# Patient Record
Sex: FEMALE | Race: WHITE | Employment: FULL TIME | URBAN - METROPOLITAN AREA
[De-identification: names, ages, dates, MRNs, and addresses within clinical notes are randomized per-mention and may not be internally consistent; named-entity substitution may affect disease eponyms.]

---

## 2017-01-03 ENCOUNTER — OFFICE VISIT (OUTPATIENT)
Dept: PHYSICAL THERAPY | Age: 67
End: 2017-01-03
Attending: ORTHOPAEDIC SURGERY
Payer: COMMERCIAL

## 2017-01-03 PROCEDURE — 97116 GAIT TRAINING THERAPY: CPT

## 2017-01-03 PROCEDURE — 97110 THERAPEUTIC EXERCISES: CPT

## 2017-01-03 NOTE — PROGRESS NOTES
Dx: s/p L TKA 12/1/16              Authorized # of Visits:  13 Saji Honeycutt         Next MD visit: Name of Surgeon: Park Gray;  Follow up appt:~ Jan 26th  Fall Risk: standard        Precautions: no additional             Subjective: Pt reports significant stiffn extension        SLR 10 reps  X x 2 sets        Heel slide 10 reps STM to incision+ patellar mobilization gr III 5 min         Seated knee flexion overpressure stretch 10 sec hold x 20 reps Heel slide 10 reps        Seated TKE 10 reps Seated knee flexion o

## 2017-01-05 ENCOUNTER — OFFICE VISIT (OUTPATIENT)
Dept: PHYSICAL THERAPY | Age: 67
End: 2017-01-05
Attending: ORTHOPAEDIC SURGERY
Payer: COMMERCIAL

## 2017-01-05 PROCEDURE — 97110 THERAPEUTIC EXERCISES: CPT

## 2017-01-05 PROCEDURE — 97530 THERAPEUTIC ACTIVITIES: CPT

## 2017-01-05 NOTE — PROGRESS NOTES
Dx: s/p L TKA 12/1/16              Authorized # of Visits:  13 Vibha Shipley MD visit: Name of Surgeon: Manny Miranda;  Follow up appt:~ Jan 26th  Fall Risk: standard        Precautions: no additional             Subjective: Pt reports significant stiffn sets- 1 set with Manual Overpressure extension X 10 reps        SLR 10 reps  X x 2 sets X 20 reps       Heel slide 10 reps STM to incision+ patellar mobilization gr III 5 min  STM to superior medial quad 3 min         HS stretch + calf stretch 30 sec hold

## 2017-01-11 ENCOUNTER — OFFICE VISIT (OUTPATIENT)
Dept: PHYSICAL THERAPY | Age: 67
End: 2017-01-11
Attending: ORTHOPAEDIC SURGERY
Payer: COMMERCIAL

## 2017-01-11 PROCEDURE — 97530 THERAPEUTIC ACTIVITIES: CPT

## 2017-01-11 PROCEDURE — 97110 THERAPEUTIC EXERCISES: CPT

## 2017-01-11 NOTE — PROGRESS NOTES
Dx: s/p L TKA 12/1/16              Authorized # of Visits:  13 Moisés Sinha         Next MD visit: Name of Surgeon: Mark Sherman;  Follow up appt:~ Jan 26th  Fall Risk: standard        Precautions: no additional             Subjective: Pt reports feeling stiff this overpressure extension 10 sec hold 10 reps        HS stretch + calf stretch 30 sec hold x 3 sets Prone knee hang extension 2 lbs 5 min      Seated knee flexion overpressure stretch 10 sec hold x 20 reps Heel slide 10 reps X 15 reps X 15 reps      Seated TK

## 2017-01-12 ENCOUNTER — APPOINTMENT (OUTPATIENT)
Dept: PHYSICAL THERAPY | Age: 67
End: 2017-01-12
Attending: ORTHOPAEDIC SURGERY
Payer: COMMERCIAL

## 2017-01-13 ENCOUNTER — OFFICE VISIT (OUTPATIENT)
Dept: PHYSICAL THERAPY | Age: 67
End: 2017-01-13
Attending: ORTHOPAEDIC SURGERY
Payer: COMMERCIAL

## 2017-01-13 PROCEDURE — 97110 THERAPEUTIC EXERCISES: CPT

## 2017-01-13 NOTE — PROGRESS NOTES
Dx: s/p L TKA 12/1/16              Authorized # of Visits:  13 [place] [place]         Next MD visit: Name of Surgeon: Gissel Hale;  Follow up appt:~ Jan 26th  Fall Risk: standard        Precautions: no additional             Subjective: Pt reports doing some exercis Quad set 10 sec hold x 10 reps with manual overpressure extension  X 10 reps 10 sec hold x 3 sets- 1 set with Manual Overpressure extension X 10 reps   X 10 reps 10 sec hold x 3 sets Supine 10 sec hold x 10 reps     SLR 10 reps  X x 2 sets X 20 reps HS- knee extension 2 #  Charges:  There ex: 3     Total Timed Treatment: 45 min  Total Treatment Time: 50 min

## 2017-01-17 ENCOUNTER — OFFICE VISIT (OUTPATIENT)
Dept: PHYSICAL THERAPY | Age: 67
End: 2017-01-17
Attending: ORTHOPAEDIC SURGERY
Payer: COMMERCIAL

## 2017-01-17 PROCEDURE — 97140 MANUAL THERAPY 1/> REGIONS: CPT

## 2017-01-17 PROCEDURE — 97110 THERAPEUTIC EXERCISES: CPT

## 2017-01-17 NOTE — PROGRESS NOTES
Dx: s/p L TKA 12/1/16              Authorized # of Visits:  13 Adriane Hernandez         Next MD visit: Name of Surgeon: Kelly Valladares;  Follow up appt:~ Jan 26th  Fall Risk: standard        Precautions: no additional             Subjective: Pt reports muscle soreness in level 8  X seat 8  X 5 min; seat level 8 X 5 min; seat level 7    Quad set 10 sec hold x 10 reps with manual overpressure extension  X 10 reps 10 sec hold x 3 sets- 1 set with Manual Overpressure extension X 10 reps   X 10 reps 10 sec hold x 3 sets Supine leg press level 5 25 reps R/L STM to posterior medial HS on the R, incision 5 min    Prone knee hang 2-3 min no weight  Prone knee hang 3 min, no pain  Pt education: HEP recommendations and benefits  Pt education: HEP Update with weighted extension Shuttle

## 2017-01-19 ENCOUNTER — OFFICE VISIT (OUTPATIENT)
Dept: PHYSICAL THERAPY | Age: 67
End: 2017-01-19
Attending: ORTHOPAEDIC SURGERY
Payer: COMMERCIAL

## 2017-01-19 PROCEDURE — 97140 MANUAL THERAPY 1/> REGIONS: CPT

## 2017-01-19 PROCEDURE — 97110 THERAPEUTIC EXERCISES: CPT

## 2017-01-19 NOTE — PROGRESS NOTES
Dx: s/p L TKA 12/1/16              Authorized # of Visits:  13 Marcelline Dakin         Next MD visit: Name of Surgeon: Madhuri Ricketts;  Follow up appt:~ Jan 26th  Fall Risk: standard        Precautions: no additional             Subjective: Pt report she has been workin level 7 4 min NuStep, 3 load   Quad set 10 sec hold x 10 reps with manual overpressure extension  X 10 reps 10 sec hold x 3 sets- 1 set with Manual Overpressure extension X 10 reps   X 10 reps 10 sec hold x 3 sets Supine 10 sec hold x 10 reps Quad set 10 s unremarkable X 20 reps X 20 reps SLR with 10 sec hold x 10 reps on the L -   Stairs 4 inch up 10 reps; up and over- forward/retro 10 reps  Gait 5 laps 25' with SPC X 5 laps X 5 laps no SPC Shuttle single leg press level 5 25 reps R/L STM to posterior media

## 2017-01-24 ENCOUNTER — OFFICE VISIT (OUTPATIENT)
Dept: PHYSICAL THERAPY | Age: 67
End: 2017-01-24
Attending: ORTHOPAEDIC SURGERY
Payer: COMMERCIAL

## 2017-01-24 PROCEDURE — 97110 THERAPEUTIC EXERCISES: CPT

## 2017-01-24 PROCEDURE — 97140 MANUAL THERAPY 1/> REGIONS: CPT

## 2017-01-24 PROCEDURE — 97035 APP MDLTY 1+ULTRASOUND EA 15: CPT

## 2017-01-24 NOTE — PROGRESS NOTES
Dx: s/p L TKA 12/1/16              Authorized # of Visits:  13 Rehana Shipley MD visit: Name of Surgeon: Mark Lawson;  Follow up appt:~ Jan 26th  Fall Risk: standard        Precautions: no additional             Subjective: Pt reports she has a lot of m level 8 full revolution X level 8  X seat 8  X 5 min; seat level 8 X 5 min; seat level 7 4 min NuStep, 3 load X 4 min; seat level 8   Quad set 10 sec hold x 10 reps with manual overpressure extension  X 10 reps 10 sec hold x 3 sets- 1 set with Manual Overp green   Gait training 8 min: VCs for heel strike and knee flexion w/ and with SPC Step ups 6 inch 10 reps x 2 sets Stairs: reciprocal 1 FOS x 2 reps 1 UE assist - Closed chain HS curls on stool; forward 30'; back 30' x 2 reps Prone quad stretch 30 sec hold

## 2017-01-26 ENCOUNTER — OFFICE VISIT (OUTPATIENT)
Dept: PHYSICAL THERAPY | Age: 67
End: 2017-01-26
Attending: ORTHOPAEDIC SURGERY
Payer: COMMERCIAL

## 2017-01-26 PROCEDURE — 97110 THERAPEUTIC EXERCISES: CPT

## 2017-01-26 PROCEDURE — 97140 MANUAL THERAPY 1/> REGIONS: CPT

## 2017-01-26 NOTE — PROGRESS NOTES
Dx: s/p L TKA 12/1/16              Authorized # of Visits:  13 Adriane Hernandez         Next MD visit: Name of Surgeon: Kelly Valladares;  Follow up appt:~ Jan 26th  Fall Risk: standard        Precautions: no additional           Progress Summary:   Pt attended 10 visits i will be independent and compliant with comprehensive HEP to maintain progress achieved in PT- MET    Plan: Continue per plan of care to address strength, ROM, balance, proprioception, functional activity tolerance for additional 3-5 therapy sessions 1-2x/w extension OP 10 reps x 2 sets  Standing TKE, ball at wall, 2 sec, x20 STM to post knee after US, 5 min Seated knee flexion manual stretch 10 sec hold x 10 reps   X 15 reps X 15 reps with contract relax 10 sec hold 10 sec relax Shuttle: DL: x20, 5c  SL: x20

## 2017-01-31 ENCOUNTER — OFFICE VISIT (OUTPATIENT)
Dept: PHYSICAL THERAPY | Age: 67
End: 2017-01-31
Attending: ORTHOPAEDIC SURGERY
Payer: COMMERCIAL

## 2017-01-31 PROCEDURE — 97110 THERAPEUTIC EXERCISES: CPT

## 2017-01-31 PROCEDURE — 97140 MANUAL THERAPY 1/> REGIONS: CPT

## 2017-01-31 NOTE — PROGRESS NOTES
Dx: s/p L TKA 12/1/16              Authorized # of Visits:  2 used 2016; 8 auth 2017+ 5 pending 2017        Next MD visit: Name of Surgeon: Lendell Cowden;  Follow up appt:~ Jan 26th  Fall Risk: standard        Precautions: no additional           Subjective: Pt Quad set: x20, 3 sec, distal LE on bolste Manual OP extension 20 sec hold x 10 reps x 2 sets with quad sets    Manual extension overpressure 5 min intermittently X 10 sec hold max tolerance Manual STM - post knee ham/calf  10 min Manual STM - pes anserine the R, incision 5 min - - - Floor to stand transfer x 1 attempt with VCs and demonstrative cues   Shuttle double leg heel raises with calf stretch 25 reps- 10 sec hold calf stretch - - - - -   HEP (written handouts provided) and pt education: calf stretch;

## 2017-02-01 ENCOUNTER — TELEPHONE (OUTPATIENT)
Dept: FAMILY MEDICINE CLINIC | Facility: CLINIC | Age: 67
End: 2017-02-01

## 2017-02-01 DIAGNOSIS — E78.5 DYSLIPIDEMIA: Primary | ICD-10-CM

## 2017-02-01 DIAGNOSIS — I10 ESSENTIAL HYPERTENSION, BENIGN: ICD-10-CM

## 2017-02-01 NOTE — TELEPHONE ENCOUNTER
Please advise. Last seen 7/2016 for these issues but since then for her knee issue- in Sept, Oct and Nov. 2016. Last labs 2016 October.   She is looking for a mail order refill?    thanks

## 2017-02-01 NOTE — TELEPHONE ENCOUNTER
simvastatin 20 MG Oral Tab  Losartan Potassium 100 MG Oral Tab  Pt is running low on her meds. She had knee surgery - She would like to [facility] to [facility]. She will make appt - as soon as she is doing better.

## 2017-02-02 ENCOUNTER — OFFICE VISIT (OUTPATIENT)
Dept: PHYSICAL THERAPY | Age: 67
End: 2017-02-02
Attending: ORTHOPAEDIC SURGERY
Payer: COMMERCIAL

## 2017-02-02 PROCEDURE — 97110 THERAPEUTIC EXERCISES: CPT

## 2017-02-02 PROCEDURE — 97140 MANUAL THERAPY 1/> REGIONS: CPT

## 2017-02-02 RX ORDER — SIMVASTATIN 20 MG
20 TABLET ORAL
Qty: 90 TABLET | Refills: 0 | Status: SHIPPED | OUTPATIENT
Start: 2017-02-02 | End: 2017-05-08

## 2017-02-02 RX ORDER — LOSARTAN POTASSIUM 100 MG/1
100 TABLET ORAL DAILY
Qty: 90 TABLET | Refills: 0 | Status: SHIPPED | OUTPATIENT
Start: 2017-02-02 | End: 2017-05-08

## 2017-02-02 NOTE — PROGRESS NOTES
Dx: s/p L TKA 12/1/16              Authorized # of Visits:  2 used 2016; 8 auth 2017+ 5 pending 2017        Next MD visit: Name of Surgeon: Lindsay Gutierres;  Follow up appt:~ Jan 26th  Fall Risk: standard        Precautions: no additional           Subjective: Pt set: x20, 3 sec, distal LE on bolster Quad set: x20, 3 sec, distal LE on bolste Manual OP extension 20 sec hold x 10 reps x 2 sets with quad sets  X 20 sec hold x 10 reps   Manual extension overpressure 5 min intermittently X 10 sec hold max tolerance [facility] finger touch support, x20 Sit to stand 20 reps Stairs reciprocal rail on R/L unilaterally x 2 sets Gait in gym 2 laps 900' in <10 min    X 20 reps SLR with 10 sec hold x 10 reps on the L - Box lunge for flex stretch, 8 inch, 10 sec, x5 - Gait in gym, incre

## 2017-02-07 ENCOUNTER — OFFICE VISIT (OUTPATIENT)
Dept: PHYSICAL THERAPY | Age: 67
End: 2017-02-07
Attending: ORTHOPAEDIC SURGERY
Payer: COMMERCIAL

## 2017-02-07 PROCEDURE — 97140 MANUAL THERAPY 1/> REGIONS: CPT

## 2017-02-07 PROCEDURE — 97110 THERAPEUTIC EXERCISES: CPT

## 2017-02-07 NOTE — PROGRESS NOTES
Dx: s/p L TKA 12/1/16              Authorized # of Visits:  2 used 2016; 8 auth 2017+ 5 pending 2017        Next MD visit: Name of Surgeon: Juana Hart;  Follow up appt:~ Jan 26th  Fall Risk: standard        Precautions: no additional           Subjective: Pt 5 min seat level 7  Bike level 5 hills 5 min seat level 7  X 5 min   Supine 10 sec hold x 10 reps Quad set 10 sec hold x 10 reps Quad set: x20, 3 sec, distal LE on bolster Quad set: x20, 3 sec, distal LE on bolster Quad set: x20, 3 sec, distal LE on bolste 10 sec relax Shuttle: DL: x20, 5c  SL: x20, 4c standing TKE, x20 green Seated CC hamstring curls on stool 4 laps Shuttle single leg press level 5 20 reps R/L x 2 sets Shuttle single leg press level 6 20 reps R/L x 2 sets Shuttle double leg heel raises leve

## 2017-02-09 ENCOUNTER — OFFICE VISIT (OUTPATIENT)
Dept: PHYSICAL THERAPY | Age: 67
End: 2017-02-09
Attending: ORTHOPAEDIC SURGERY
Payer: COMMERCIAL

## 2017-02-09 PROCEDURE — 97110 THERAPEUTIC EXERCISES: CPT

## 2017-02-09 PROCEDURE — 97112 NEUROMUSCULAR REEDUCATION: CPT

## 2017-02-09 NOTE — PROGRESS NOTES
Dx: s/p L TKA 12/1/16              Authorized # of Visits:  2 used 2016; 8 auth 2017+ 5 pending 2017        Next MD visit: Name of Surgeon: Manny Miranda;  Follow up appt:~ Jan 26th  Fall Risk: standard        Precautions: no additional           Subjective: Pt HEP      1/24/17  Tx#: 9/13 1/26/17  Tx#: 10/13 1/31/17  Tx#: 11/15 2/2/217  Tx#: 12/15   2/7/17  Tx#: 13/15 2/9/17  Tx#: 14/15   X 4 min; seat level 8 Bike X 5 min seat level 8, tigthness Bike 5 min seat level 7  Bike level 5 hills 5 min seat level 7  X 5 stand 20 reps Stairs reciprocal rail on R/L unilaterally x 2 sets Gait in gym 2 laps 900' in <10 min  Standing TKE with red band resistance 10 sec hold x 10 reps x 2 sets Standing posterior stepping with ER 10 reps R/L   Box lunge for flex stretch, 8 inch,

## 2017-02-14 ENCOUNTER — OFFICE VISIT (OUTPATIENT)
Dept: PHYSICAL THERAPY | Age: 67
End: 2017-02-14
Attending: ORTHOPAEDIC SURGERY
Payer: COMMERCIAL

## 2017-02-14 PROCEDURE — 97110 THERAPEUTIC EXERCISES: CPT

## 2017-02-14 NOTE — PROGRESS NOTES
Dx: s/p L TKA 12/1/16              Authorized # of Visits:  2 used 2016; 8 auth 2017+ 5 pending 2017        Next MD visit: Name of Surgeon: Angelique Mace;  Follow up appt:~ Jan 26th  Fall Risk: standard        Precautions: no additional           Discharge Jhonathan course of care. Thank you for your referral. If you have any questions, please contact me at Dept: [phone].     Sincerely,  Electronically signed by therapist: Romayne Cliche, PT        1/24/17  Tx#: 9/13 1/26/17  Tx#: 10/13 1/31/17  Tx#: 11/15 2/2/ piriformis stretch 30 sec hold x 3 sets -   standing TKE, x20 green Seated CC hamstring curls on stool 4 laps Shuttle single leg press level 5 20 reps R/L x 2 sets Shuttle single leg press level 6 20 reps R/L x 2 sets Shuttle double leg heel raises level 6

## 2017-02-23 ENCOUNTER — APPOINTMENT (OUTPATIENT)
Dept: PHYSICAL THERAPY | Age: 67
End: 2017-02-23
Attending: ORTHOPAEDIC SURGERY
Payer: COMMERCIAL

## 2017-05-08 ENCOUNTER — OFFICE VISIT (OUTPATIENT)
Dept: FAMILY MEDICINE CLINIC | Facility: CLINIC | Age: 67
End: 2017-05-08

## 2017-05-08 VITALS
HEART RATE: 83 BPM | HEIGHT: 66 IN | WEIGHT: 210.19 LBS | RESPIRATION RATE: 16 BRPM | BODY MASS INDEX: 33.78 KG/M2 | DIASTOLIC BLOOD PRESSURE: 74 MMHG | SYSTOLIC BLOOD PRESSURE: 136 MMHG

## 2017-05-08 DIAGNOSIS — I10 ESSENTIAL HYPERTENSION, BENIGN: ICD-10-CM

## 2017-05-08 DIAGNOSIS — E78.5 DYSLIPIDEMIA: ICD-10-CM

## 2017-05-08 DIAGNOSIS — Z23 NEED FOR VACCINATION: ICD-10-CM

## 2017-05-08 DIAGNOSIS — Z00.00 ROUTINE GENERAL MEDICAL EXAMINATION AT A HEALTH CARE FACILITY: Primary | ICD-10-CM

## 2017-05-08 DIAGNOSIS — E03.9 HYPOTHYROIDISM (ACQUIRED): Primary | ICD-10-CM

## 2017-05-08 DIAGNOSIS — Z00.00 LABORATORY EXAM ORDERED AS PART OF ROUTINE GENERAL MEDICAL EXAMINATION: ICD-10-CM

## 2017-05-08 DIAGNOSIS — R73.03 PREDIABETES: ICD-10-CM

## 2017-05-08 DIAGNOSIS — Z12.31 ENCOUNTER FOR SCREENING MAMMOGRAM FOR MALIGNANT NEOPLASM OF BREAST: ICD-10-CM

## 2017-05-08 DIAGNOSIS — E66.09 NON MORBID OBESITY DUE TO EXCESS CALORIES: ICD-10-CM

## 2017-05-08 PROCEDURE — 90471 IMMUNIZATION ADMIN: CPT | Performed by: FAMILY MEDICINE

## 2017-05-08 PROCEDURE — 90670 PCV13 VACCINE IM: CPT | Performed by: FAMILY MEDICINE

## 2017-05-08 PROCEDURE — 99397 PER PM REEVAL EST PAT 65+ YR: CPT | Performed by: FAMILY MEDICINE

## 2017-05-08 RX ORDER — OMEPRAZOLE 20 MG/1
20 CAPSULE, DELAYED RELEASE ORAL DAILY
Refills: 0 | COMMUNITY
Start: 2017-03-19 | End: 2017-10-02

## 2017-05-08 RX ORDER — SIMVASTATIN 20 MG
20 TABLET ORAL
Qty: 30 TABLET | Refills: 0 | Status: SHIPPED | OUTPATIENT
Start: 2017-05-08 | End: 2017-05-31

## 2017-05-08 RX ORDER — LEVOTHYROXINE SODIUM 0.07 MG/1
75 TABLET ORAL DAILY
Qty: 30 TABLET | Refills: 0 | Status: SHIPPED | OUTPATIENT
Start: 2017-05-08 | End: 2017-05-31

## 2017-05-08 RX ORDER — LOSARTAN POTASSIUM 100 MG/1
100 TABLET ORAL DAILY
Qty: 30 TABLET | Refills: 0 | Status: SHIPPED | OUTPATIENT
Start: 2017-05-08 | End: 2017-05-31

## 2017-05-08 NOTE — PROGRESS NOTES
HPI:   Dontae Beauchamp is a 74year old female who presents for a complete physical exam.   Symptoms: denies discharge, itching, burning or dysuria.    Sexually active: no   Last colonoscopy: 2016  Last mammogram: 1-2 years ago      Wt Readings from Last reflux    • Diverticulosis of large intestine    • Hypercholesterolemia 11/16/2016   • Hypothyroidism (acquired) 7/13/2012   • Status post total left knee replacement 12/7/2016          Past Surgical History    APPENDECTOMY  1972    UPPER GI ENDOSCOPY,EXAM rashes,no suspicious lesions  HEENT: atraumatic, normocephalic,ears, nose and throat are normal, mmm  EYES: PERRLA, EOMI, sclera, conjunctiva are clear  NECK: supple, no adenopathy/thyromegaly/masses  CHEST: no chest tenderness  BREAST: symmetrical, no juan Prediabetes  Recheck A1c  - Hemoglobin A1C [E]; Future    5. Laboratory exam ordered as part of routine general medical examination  - CBC W/DIFF; Future  - COMP METABOLIC PANEL; Future  - LIPID PANEL; Future  - TSH; Future  - T4 FREE;  Future  - VITAMIN D,

## 2017-05-09 ENCOUNTER — HOSPITAL ENCOUNTER (OUTPATIENT)
Dept: MAMMOGRAPHY | Age: 67
Discharge: HOME | End: 2017-05-09
Attending: FAMILY MEDICINE
Payer: COMMERCIAL

## 2017-05-09 DIAGNOSIS — Z12.31 ENCOUNTER FOR SCREENING MAMMOGRAM FOR MALIGNANT NEOPLASM OF BREAST: ICD-10-CM

## 2017-05-09 PROCEDURE — 77067 SCR MAMMO BI INCL CAD: CPT | Performed by: FAMILY MEDICINE

## 2017-05-13 ENCOUNTER — LAB ENCOUNTER (OUTPATIENT)
Dept: LAB | Age: 67
End: 2017-05-13
Attending: FAMILY MEDICINE
Payer: COMMERCIAL

## 2017-05-13 DIAGNOSIS — Z00.00 LABORATORY EXAM ORDERED AS PART OF ROUTINE GENERAL MEDICAL EXAMINATION: ICD-10-CM

## 2017-05-13 DIAGNOSIS — R73.03 PREDIABETES: ICD-10-CM

## 2017-05-13 PROCEDURE — 85025 COMPLETE CBC W/AUTO DIFF WBC: CPT

## 2017-05-13 PROCEDURE — 80053 COMPREHEN METABOLIC PANEL: CPT

## 2017-05-13 PROCEDURE — 82306 VITAMIN D 25 HYDROXY: CPT

## 2017-05-13 PROCEDURE — 83036 HEMOGLOBIN GLYCOSYLATED A1C: CPT

## 2017-05-13 PROCEDURE — 84439 ASSAY OF FREE THYROXINE: CPT

## 2017-05-13 PROCEDURE — 36415 COLL VENOUS BLD VENIPUNCTURE: CPT

## 2017-05-13 PROCEDURE — 80061 LIPID PANEL: CPT

## 2017-05-13 PROCEDURE — 84443 ASSAY THYROID STIM HORMONE: CPT

## 2017-05-19 ENCOUNTER — TELEPHONE (OUTPATIENT)
Dept: FAMILY MEDICINE CLINIC | Facility: CLINIC | Age: 67
End: 2017-05-19

## 2017-05-19 NOTE — TELEPHONE ENCOUNTER
MIRIAM regarding her test results.  I stated that the office is open until 3pm this afternoon, or she could call back on Monday after 8:15am.

## 2017-05-19 NOTE — TELEPHONE ENCOUNTER
----- Message from Ron Villanueva DO sent at 5/17/2017  9:46 PM CDT -----  Most of the blood test results are normal.  We will discuss this further at her next appointment.

## 2017-05-22 NOTE — TELEPHONE ENCOUNTER
Per Dr. Verona Jim, I informed the patient that most of her blood test results are normal, and that the results will be discussed further at her nest appointment.   Your appointments     Date & Time Appointment Department [facility])    Wednesday May 31, 2017  4:

## 2017-05-31 ENCOUNTER — OFFICE VISIT (OUTPATIENT)
Dept: FAMILY MEDICINE CLINIC | Facility: CLINIC | Age: 67
End: 2017-05-31

## 2017-05-31 VITALS
RESPIRATION RATE: 16 BRPM | HEART RATE: 90 BPM | BODY MASS INDEX: 34.1 KG/M2 | SYSTOLIC BLOOD PRESSURE: 134 MMHG | DIASTOLIC BLOOD PRESSURE: 72 MMHG | WEIGHT: 212.19 LBS | HEIGHT: 66 IN

## 2017-05-31 DIAGNOSIS — E78.2 MIXED HYPERLIPIDEMIA: ICD-10-CM

## 2017-05-31 DIAGNOSIS — I10 ESSENTIAL HYPERTENSION, BENIGN: Primary | ICD-10-CM

## 2017-05-31 DIAGNOSIS — Z79.899 ENCOUNTER FOR LONG-TERM CURRENT USE OF MEDICATION: ICD-10-CM

## 2017-05-31 DIAGNOSIS — E66.09 NON MORBID OBESITY DUE TO EXCESS CALORIES: ICD-10-CM

## 2017-05-31 DIAGNOSIS — E03.9 HYPOTHYROIDISM (ACQUIRED): ICD-10-CM

## 2017-05-31 DIAGNOSIS — E78.6 LOW HDL (UNDER 40): ICD-10-CM

## 2017-05-31 PROCEDURE — 99214 OFFICE O/P EST MOD 30 MIN: CPT | Performed by: FAMILY MEDICINE

## 2017-05-31 RX ORDER — SIMVASTATIN 20 MG
20 TABLET ORAL NIGHTLY
Qty: 90 TABLET | Refills: 1 | Status: SHIPPED | OUTPATIENT
Start: 2017-05-31 | End: 2017-12-07

## 2017-05-31 RX ORDER — LEVOTHYROXINE SODIUM 0.07 MG/1
75 TABLET ORAL
Qty: 90 TABLET | Refills: 3 | Status: SHIPPED | OUTPATIENT
Start: 2017-05-31 | End: 2018-07-30

## 2017-05-31 RX ORDER — LOSARTAN POTASSIUM 100 MG/1
100 TABLET ORAL DAILY
Qty: 90 TABLET | Refills: 1 | Status: SHIPPED | OUTPATIENT
Start: 2017-05-31 | End: 2017-12-07

## 2017-05-31 NOTE — PATIENT INSTRUCTIONS
Weight Management: Getting Started  Healthy bodies come in all shapes and sizes. Not all bodies are made to be thin. For some people, a healthy weight is higher than the average weight listed on weight charts.  Your healthcare provider can help you decide Encouragement from others can help make losing weight easier. Ask your family members and friends for support. They may even want to join you. Also look to your healthcare provider, registered dietitian, and  for help.  [facility]

## 2017-05-31 NOTE — PROGRESS NOTES
Igor Diaz is a 74year old female. HPI:     HTN: Stable. Severity is mild, patient is on one agent to control her hypertension. Pt has been taking medications as instructed, no medication side effects. Diet: Tries to watch sodium intake. Rfl: 0   [DISCONTINUED] losartan 100 MG Oral Tab Take 1 tablet (100 mg total) by mouth daily.  Disp: 30 tablet Rfl: 0      Past Medical History   Diagnosis Date   • Spontaneous pneumothorax 1979     right side   • Gastric polyp 5-16-14   • Essential hyperte 212 lb 3.2 oz  BMI 34.27 kg/m2  GENERAL: NAD, pleasant, normal communication, overweight WF  SKIN: no visible rashes  HEAD: NCAT  NECK: supple, no adenopathy, no thyromegaly, no masses  LUNGS: CTA A/P no wheezes/ronchi/rales/crackles  CARDIO: RRR, +S1/S2, Bilirubin      0.1-2.0 mg/dL 0.4  0.4   TOTAL PROTEIN      6.1-8.3 g/dL 7.4  7.2   Albumin      3.5-4.8 g/dL 3.5  3.5   Sodium      136-144 mmol/L 141  140   Potassium      3.6-5.1 mmol/L 4.0  5.0   Chloride      101-111 mmol/L 105  106   Carbon Dioxide, T exercising if able. 7. Non morbid obesity due to excess calories  As above in #6. No orders of the defined types were placed in this encounter.        Meds & Refills for this Visit:  Signed Prescriptions Disp Refills    Levothyroxine Sodium 75 M

## 2017-07-11 ENCOUNTER — TELEPHONE (OUTPATIENT)
Dept: FAMILY MEDICINE CLINIC | Facility: CLINIC | Age: 67
End: 2017-07-11

## 2017-07-11 DIAGNOSIS — K21.9 GASTROESOPHAGEAL REFLUX DISEASE, ESOPHAGITIS PRESENCE NOT SPECIFIED: Primary | ICD-10-CM

## 2017-07-11 NOTE — TELEPHONE ENCOUNTER
Reason for the order/referral:Referral   PCP: Dr. Emilie Whitman   Refer to Provider Dr. Kat Vidal   Specialty:Gastroenterology   Patient Insurance: Payor: [facility] ELOISE/DILCIA / Plan: [id number] / Product Type: HMO /   Has the patient been seen by their PCP fo

## 2017-10-02 ENCOUNTER — OFFICE VISIT (OUTPATIENT)
Dept: FAMILY MEDICINE CLINIC | Facility: CLINIC | Age: 67
End: 2017-10-02

## 2017-10-02 ENCOUNTER — TELEPHONE (OUTPATIENT)
Dept: FAMILY MEDICINE CLINIC | Facility: CLINIC | Age: 67
End: 2017-10-02

## 2017-10-02 VITALS
HEART RATE: 100 BPM | HEIGHT: 66 IN | BODY MASS INDEX: 34.17 KG/M2 | SYSTOLIC BLOOD PRESSURE: 136 MMHG | TEMPERATURE: 99 F | WEIGHT: 212.63 LBS | DIASTOLIC BLOOD PRESSURE: 84 MMHG

## 2017-10-02 DIAGNOSIS — L03.115 CELLULITIS OF RIGHT THIGH: Primary | ICD-10-CM

## 2017-10-02 PROCEDURE — 99214 OFFICE O/P EST MOD 30 MIN: CPT | Performed by: FAMILY MEDICINE

## 2017-10-02 RX ORDER — CEPHALEXIN 750 MG/1
750 CAPSULE ORAL 3 TIMES DAILY
Qty: 30 CAPSULE | Refills: 0 | Status: SHIPPED | OUTPATIENT
Start: 2017-10-02 | End: 2017-10-12

## 2017-10-02 RX ORDER — RANITIDINE 300 MG/1
1 TABLET ORAL DAILY
Refills: 1 | COMMUNITY
Start: 2017-07-28 | End: 2018-06-08

## 2017-10-02 NOTE — PATIENT INSTRUCTIONS
Cellulitis  Cellulitis is an infection of the deep layers of skin. A break in the skin, such as a cut or scratch, can let bacteria under the skin. If the bacteria get to deep layers of the skin, it can be serious.  If not treated, cellulitis can get into · Fever higher of 100.4º F (38.0º C) or higher after 2 days on antibiotics  Date Last Reviewed: 9/1/2016  © [id number] [address], [address] [place] [address]. All rights reserved.  This information is not intended as a substitut · Discharge or pus draining from the area  · Fever of 100.4°F (38°C) or higher, or as directed by your healthcare provider  · Pain that gets worse in or around the infected   · Redness that gets worse in or around the infected area, particularly if the are

## 2017-10-02 NOTE — TELEPHONE ENCOUNTER
Lynda Parker thinks she has gotten bitten by a spider on her leg it is hard and red, she would like to come in to see Dr Stephanie Hardy today,she gets off work around 3, she said she can be her by 4 30 if poss, please call Lynda Parker at [phone] to let her know when wo

## 2017-10-02 NOTE — PROGRESS NOTES
Romeo Savage is a 74year old female. HPI:       Patient complains of a possible \"infected spider bite\". Patient states she noticed an area on her right inner thigh approximately 4 days ago. Since then the redness has been spreading.   It is pa facility     BMI 34.0-34.9,adult     Cellulitis of right thigh       Meclofenamate Sodium    Hives    Comment:CAPS  Levaquin                Dizziness    Comment:  Lexapro                 Dizziness    Comment:fatigue   Past Medical History:   Diagnosis Date Constitutional: She is oriented to person, place, and time. She appears well-developed and well-nourished. No distress. HENT:   Head: Normocephalic and atraumatic.    Eyes: Conjunctivae and EOM are normal.   Neurological: She is alert and oriented to pe

## 2017-10-02 NOTE — TELEPHONE ENCOUNTER
Left message for patient that she was scheduled for an appointment today at 5:15  Asked patient to call to confirm or cancel  Future Appointments  Date Time Provider Rajesh Pfeiffer   10/2/2017 5:00 PM Riky Stover,  [facility] [place]

## 2017-10-04 ENCOUNTER — TELEPHONE (OUTPATIENT)
Dept: FAMILY MEDICINE CLINIC | Facility: CLINIC | Age: 67
End: 2017-10-04

## 2017-10-04 NOTE — TELEPHONE ENCOUNTER
Spoke to patient and she said the red area appears to be somewhat smaller but the part in the middle was draining blood/pus. Area had broken open on it's own. Still on antibiotic.   Pt will keep it covered with a fresh dressing and will keep her appt for t

## 2017-10-04 NOTE — TELEPHONE ENCOUNTER
Pt has question if she should come in sooner with her reddness/rash? She has appointment tomorrow at 4:00. It seems to have gotten better but it is oozing now and a tiny blood.

## 2017-10-05 ENCOUNTER — OFFICE VISIT (OUTPATIENT)
Dept: FAMILY MEDICINE CLINIC | Facility: CLINIC | Age: 67
End: 2017-10-05

## 2017-10-05 VITALS
HEART RATE: 88 BPM | BODY MASS INDEX: 34.07 KG/M2 | TEMPERATURE: 99 F | HEIGHT: 66 IN | WEIGHT: 212 LBS | SYSTOLIC BLOOD PRESSURE: 127 MMHG | RESPIRATION RATE: 16 BRPM | DIASTOLIC BLOOD PRESSURE: 72 MMHG

## 2017-10-05 DIAGNOSIS — L03.115 CELLULITIS OF RIGHT THIGH: Primary | ICD-10-CM

## 2017-10-05 DIAGNOSIS — L02.415 ABSCESS OF RIGHT THIGH: ICD-10-CM

## 2017-10-05 PROCEDURE — 99213 OFFICE O/P EST LOW 20 MIN: CPT | Performed by: FAMILY MEDICINE

## 2017-10-05 NOTE — PROGRESS NOTES
Lakisha Gonzalez is a 74year old female. HPI:       Cellulitis of right medial thigh is slowly improving. Patient has been on the antibiotic for about 2-1/2 days worth of dosing.   The pharmacy did not have 750 mg caps so she was given 250 mg tabs and of right thigh       Meclofenamate Sodium    Hives    Comment:CAPS  Levaquin                Dizziness    Comment:  Lexapro                 Dizziness    Comment:fatigue   Past Medical History:   Diagnosis Date   • Cataract    • Cellulitis of right thigh 10/ distress. Eyes: Conjunctivae and EOM are normal. No scleral icterus. Neurological: She is oriented to person, place, and time. Skin:   Right medial thigh: The erythema is receding. Induration has receded.   There is now a central localized area of fi

## 2017-10-06 PROBLEM — L02.415 ABSCESS OF RIGHT THIGH: Status: ACTIVE | Noted: 2017-10-06

## 2017-10-09 ENCOUNTER — OFFICE VISIT (OUTPATIENT)
Dept: FAMILY MEDICINE CLINIC | Facility: CLINIC | Age: 67
End: 2017-10-09

## 2017-10-09 VITALS
SYSTOLIC BLOOD PRESSURE: 142 MMHG | HEIGHT: 66 IN | BODY MASS INDEX: 34.23 KG/M2 | HEART RATE: 88 BPM | DIASTOLIC BLOOD PRESSURE: 78 MMHG | WEIGHT: 213 LBS

## 2017-10-09 DIAGNOSIS — L03.115 CELLULITIS OF RIGHT THIGH: Primary | ICD-10-CM

## 2017-10-09 DIAGNOSIS — L02.415 ABSCESS OF RIGHT THIGH: ICD-10-CM

## 2017-10-09 PROCEDURE — 99214 OFFICE O/P EST MOD 30 MIN: CPT | Performed by: FAMILY MEDICINE

## 2017-10-09 NOTE — PATIENT INSTRUCTIONS
-- start anitbiotic ointment 2x/day for 7-10days  -- complete oral antibiotics as prescribed  -- start warm compress 2-3x/day for 5-10 min  -- start epsom salt soaks daily  -- followup thurs or Friday this week

## 2017-10-10 NOTE — PROGRESS NOTES
Curt Anna is a 74year old female here for Patient presents with:  Abscess: Follow-up on abscess on Inner Right thigh, patient states that if is getting better.       HPI:     Cellulitis/Abscess of right thigh  -improved significantly with keflex Disp: 30 capsule Rfl: 0   Levothyroxine Sodium 75 MCG Oral Tab Take 1 tablet (75 mcg total) by mouth before breakfast. Disp: 90 tablet Rfl: 3   losartan 100 MG Oral Tab Take 1 tablet (100 mg total) by mouth daily.  Disp: 90 tablet Rfl: 1   simvastatin 20 MG 30 g 0      Sig: Apply to affected area bid x 7-10 days           Imaging & Referrals:  None     Winnifred Ormond, MD

## 2017-10-13 ENCOUNTER — OFFICE VISIT (OUTPATIENT)
Dept: FAMILY MEDICINE CLINIC | Facility: CLINIC | Age: 67
End: 2017-10-13

## 2017-10-13 VITALS
HEART RATE: 84 BPM | TEMPERATURE: 98 F | DIASTOLIC BLOOD PRESSURE: 68 MMHG | HEIGHT: 66.25 IN | WEIGHT: 212 LBS | BODY MASS INDEX: 34.07 KG/M2 | SYSTOLIC BLOOD PRESSURE: 130 MMHG

## 2017-10-13 DIAGNOSIS — L03.115 CELLULITIS OF RIGHT THIGH: ICD-10-CM

## 2017-10-13 DIAGNOSIS — L02.415 ABSCESS OF RIGHT LEG: Primary | ICD-10-CM

## 2017-10-13 PROCEDURE — 10060 I&D ABSCESS SIMPLE/SINGLE: CPT | Performed by: FAMILY MEDICINE

## 2017-10-13 RX ORDER — CEPHALEXIN 500 MG/1
500 CAPSULE ORAL 3 TIMES DAILY
Qty: 15 CAPSULE | Refills: 0 | Status: SHIPPED | OUTPATIENT
Start: 2017-10-13 | End: 2017-10-18

## 2017-10-13 NOTE — PROGRESS NOTES
Bernadette Camejo is a 74year old female. HPI:   Patient is in for follow-up on abscess and cellulitis on the right inner thigh. Patient does state that the redness is now just pink and the area is no longer warm to the touch.   Tenderness is down to ju status: Former Smoker                                                              Packs/day: 2.00      Years: 3.00         Quit date: 11/4/1979  Smokeless tobacco: Never Used                      Alcohol use:  No                 REVIEW OF SYSTEMS:   GENERA this encounter. Meds & Refills for this Visit:  Signed Prescriptions Disp Refills    cephALEXin 500 MG Oral Cap 15 capsule 0      Sig: Take 1 capsule (500 mg total) by mouth 3 (three) times daily. Imaging & Consults:  None  1.  Abscess of rig

## 2017-10-25 ENCOUNTER — TELEPHONE (OUTPATIENT)
Dept: FAMILY MEDICINE CLINIC | Facility: CLINIC | Age: 67
End: 2017-10-25

## 2017-10-25 NOTE — TELEPHONE ENCOUNTER
Parking placard form completed and placed in an envelope at the  ready for pickup.  Lm for pt that form is ready

## 2017-11-08 ENCOUNTER — TELEPHONE (OUTPATIENT)
Dept: FAMILY MEDICINE CLINIC | Facility: CLINIC | Age: 67
End: 2017-11-08

## 2017-11-08 NOTE — TELEPHONE ENCOUNTER
Pt is calling regarding her signed parking placard that was picked up yesterday. Pt said it was not the right parking placard form that she dropped off. The form she received was for a temporary parking placard.   That has been rejected by the state in th

## 2017-11-08 NOTE — TELEPHONE ENCOUNTER
Caitlin Capps is calling regarding her placquard form, it is not the form she brought in and she needs to speak with a nurse regarding the form, the  is going to reject the form because it is not the one she brought in, and it has happended to h

## 2017-11-10 ENCOUNTER — TELEPHONE (OUTPATIENT)
Dept: FAMILY MEDICINE CLINIC | Facility: CLINIC | Age: 67
End: 2017-11-10

## 2017-11-10 DIAGNOSIS — Z96.652 STATUS POST TOTAL LEFT KNEE REPLACEMENT: Primary | ICD-10-CM

## 2017-11-10 NOTE — TELEPHONE ENCOUNTER
Amparo Mcguire  Female, 74year old, 01/30/1950  Last Weight:   212 lb  Preferred Phone:   [phone]  Home#:   *P:[phone]; J:[phone]; Q:[phone]  Mobile#:   [phone]  Work#:   [phone]  PCP:   Perez Watts, DO  Next Appt w

## 2017-11-10 NOTE — TELEPHONE ENCOUNTER
.Reason for the order/referral:1 yr follow up   PCP: Samual Pallas   Refer to Provider: Antoni Landrum   Specialty:Ortho   Patient Insurance: Payor: [employer]/DILCIA / Plan: [id number] / Product Type: HMO /   Has the patient been seen by their PCP for this condition:

## 2017-12-07 DIAGNOSIS — I10 ESSENTIAL HYPERTENSION, BENIGN: ICD-10-CM

## 2017-12-07 DIAGNOSIS — E78.2 MIXED HYPERLIPIDEMIA: ICD-10-CM

## 2017-12-07 DIAGNOSIS — E78.6 LOW HDL (UNDER 40): ICD-10-CM

## 2017-12-07 DIAGNOSIS — Z79.899 ENCOUNTER FOR LONG-TERM CURRENT USE OF MEDICATION: ICD-10-CM

## 2017-12-07 RX ORDER — SIMVASTATIN 20 MG
20 TABLET ORAL NIGHTLY
Qty: 90 TABLET | Refills: 0 | Status: SHIPPED | OUTPATIENT
Start: 2017-12-07 | End: 2018-03-08

## 2017-12-07 RX ORDER — LOSARTAN POTASSIUM 100 MG/1
100 TABLET ORAL DAILY
Qty: 90 TABLET | Refills: 0 | Status: SHIPPED | OUTPATIENT
Start: 2017-12-07 | End: 2018-03-08

## 2017-12-07 RX ORDER — AMOXICILLIN 500 MG/1
CAPSULE ORAL
Refills: 0 | COMMUNITY
Start: 2017-11-27 | End: 2018-02-12

## 2017-12-07 RX ORDER — CEPHALEXIN 250 MG/1
CAPSULE ORAL
Refills: 0 | COMMUNITY
Start: 2017-10-02 | End: 2018-02-12 | Stop reason: ALTCHOICE

## 2017-12-07 NOTE — TELEPHONE ENCOUNTER
simvastatin 20 MG Oral Tab  losartan 100 MG Oral Tab    Please refill [facility]    Future Appointments  Date Time Provider Rajesh Pfeiffer   12/18/2017 4:00 PM Selvin Cronin DO [facility] [place]   12/19/2017 1:10 PM Chani Chamberlain

## 2018-02-12 ENCOUNTER — OFFICE VISIT (OUTPATIENT)
Dept: FAMILY MEDICINE CLINIC | Facility: CLINIC | Age: 68
End: 2018-02-12

## 2018-02-12 VITALS
TEMPERATURE: 97 F | WEIGHT: 209 LBS | BODY MASS INDEX: 33.59 KG/M2 | HEIGHT: 66.14 IN | SYSTOLIC BLOOD PRESSURE: 128 MMHG | DIASTOLIC BLOOD PRESSURE: 74 MMHG | HEART RATE: 80 BPM | RESPIRATION RATE: 16 BRPM

## 2018-02-12 DIAGNOSIS — J01.90 ACUTE BACTERIAL SINUSITIS: Primary | ICD-10-CM

## 2018-02-12 DIAGNOSIS — B96.89 ACUTE BACTERIAL SINUSITIS: Primary | ICD-10-CM

## 2018-02-12 PROCEDURE — 99214 OFFICE O/P EST MOD 30 MIN: CPT | Performed by: FAMILY MEDICINE

## 2018-02-12 RX ORDER — AMOXICILLIN AND CLAVULANATE POTASSIUM 875; 125 MG/1; MG/1
1 TABLET, FILM COATED ORAL EVERY 12 HOURS
Qty: 14 TABLET | Refills: 0 | Status: SHIPPED | OUTPATIENT
Start: 2018-02-12 | End: 2018-03-21 | Stop reason: ALTCHOICE

## 2018-02-12 NOTE — PATIENT INSTRUCTIONS
Recommend start taking a probiotic such as [address] or Florastor (which is found at the pharmacy desk) while you are on the antibiotic.       Sinusitis (Antibiotic Treatment)    The sinuses are air-filled spaces within the bones of the pressure.)  · Over-the-counter antihistamines may help if allergies contributed to your sinusitis.    · Do not use nasal rinses or irrigation during an acute sinus infection, unless told to by your health care provider.  Rinsing may spread the infection to

## 2018-02-12 NOTE — PROGRESS NOTES
HPI:   Milan Guzman is a 79year old female who presents for upper respiratory symptoms for  11  days. Patient reports sore throat only at the beginning of sx's, congestion, cough with uncertain colored sputum.   Additional Symptoms Include:  Ears sha Comment: partial hystero. 1983: OOPHORECTOMY Left      Comment: Left ovary removed. 5-16-14: UPPER GI ENDOSCOPY,EXAM   No family history on file.    Smoking status: Former Smoker                                                              Packs/day: 2.00 tablet; Refill: 0    Medication use, risks, benefits, side effects and precautions discussed, patient verbalizes understanding. Questions encouraged and answered to patient's satisfaction.       Meds & Refills for this Visit:  Signed Prescriptions Disp Refi

## 2018-03-08 DIAGNOSIS — Z79.899 ENCOUNTER FOR LONG-TERM CURRENT USE OF MEDICATION: ICD-10-CM

## 2018-03-08 DIAGNOSIS — E78.6 LOW HDL (UNDER 40): ICD-10-CM

## 2018-03-08 DIAGNOSIS — E78.2 MIXED HYPERLIPIDEMIA: ICD-10-CM

## 2018-03-08 DIAGNOSIS — I10 ESSENTIAL HYPERTENSION, BENIGN: ICD-10-CM

## 2018-03-08 RX ORDER — SIMVASTATIN 20 MG
20 TABLET ORAL NIGHTLY
Qty: 90 TABLET | Refills: 0 | Status: SHIPPED | OUTPATIENT
Start: 2018-03-08 | End: 2018-03-08

## 2018-03-08 RX ORDER — LOSARTAN POTASSIUM 100 MG/1
100 TABLET ORAL DAILY
Qty: 90 TABLET | Refills: 0 | Status: SHIPPED | OUTPATIENT
Start: 2018-03-08 | End: 2018-03-21

## 2018-03-08 RX ORDER — LOSARTAN POTASSIUM 100 MG/1
100 TABLET ORAL DAILY
Qty: 90 TABLET | Refills: 0 | Status: SHIPPED | OUTPATIENT
Start: 2018-03-08 | End: 2018-03-08

## 2018-03-08 RX ORDER — SIMVASTATIN 20 MG
20 TABLET ORAL NIGHTLY
Qty: 90 TABLET | Refills: 0 | Status: SHIPPED | OUTPATIENT
Start: 2018-03-08 | End: 2018-03-21

## 2018-03-08 NOTE — TELEPHONE ENCOUNTER
Pt called and said she will be going out of town and she will need a refill on Losartan 100mg 1 daily and Simvastatin 20mg 1 daily. She would like it sent to her [facility].

## 2018-03-21 ENCOUNTER — OFFICE VISIT (OUTPATIENT)
Dept: FAMILY MEDICINE CLINIC | Facility: CLINIC | Age: 68
End: 2018-03-21

## 2018-03-21 VITALS
WEIGHT: 214.63 LBS | DIASTOLIC BLOOD PRESSURE: 68 MMHG | BODY MASS INDEX: 34.49 KG/M2 | SYSTOLIC BLOOD PRESSURE: 128 MMHG | HEART RATE: 84 BPM | HEIGHT: 66.14 IN | RESPIRATION RATE: 16 BRPM

## 2018-03-21 DIAGNOSIS — I10 ESSENTIAL HYPERTENSION, BENIGN: Primary | ICD-10-CM

## 2018-03-21 DIAGNOSIS — E78.6 LOW HDL (UNDER 40): ICD-10-CM

## 2018-03-21 DIAGNOSIS — K21.9 GASTROESOPHAGEAL REFLUX DISEASE, ESOPHAGITIS PRESENCE NOT SPECIFIED: ICD-10-CM

## 2018-03-21 DIAGNOSIS — E78.2 MIXED HYPERLIPIDEMIA: ICD-10-CM

## 2018-03-21 DIAGNOSIS — E66.09 CLASS 1 OBESITY DUE TO EXCESS CALORIES WITHOUT SERIOUS COMORBIDITY WITH BODY MASS INDEX (BMI) OF 34.0 TO 34.9 IN ADULT: ICD-10-CM

## 2018-03-21 DIAGNOSIS — Z79.899 ENCOUNTER FOR LONG-TERM CURRENT USE OF MEDICATION: ICD-10-CM

## 2018-03-21 PROCEDURE — 99214 OFFICE O/P EST MOD 30 MIN: CPT | Performed by: FAMILY MEDICINE

## 2018-03-21 RX ORDER — LOSARTAN POTASSIUM 100 MG/1
100 TABLET ORAL DAILY
Qty: 90 TABLET | Refills: 1 | Status: SHIPPED | OUTPATIENT
Start: 2018-03-21 | End: 2018-11-16

## 2018-03-21 RX ORDER — LOSARTAN POTASSIUM 100 MG/1
100 TABLET ORAL DAILY
Qty: 90 TABLET | Refills: 1 | Status: SHIPPED | OUTPATIENT
Start: 2018-03-21 | End: 2018-03-21

## 2018-03-21 RX ORDER — SIMVASTATIN 20 MG
20 TABLET ORAL NIGHTLY
Qty: 90 TABLET | Refills: 1 | Status: SHIPPED | OUTPATIENT
Start: 2018-03-21 | End: 2018-11-16

## 2018-03-21 RX ORDER — SIMVASTATIN 20 MG
20 TABLET ORAL NIGHTLY
Qty: 90 TABLET | Refills: 1 | Status: SHIPPED | OUTPATIENT
Start: 2018-03-21 | End: 2018-03-21

## 2018-03-21 NOTE — PATIENT INSTRUCTIONS
Discharge Instructions for Gastroesophageal Reflux Disease (GERD)  Gastroesophageal reflux disease (GERD) is a backflow of acid from the stomach into the swallowing tube (esophagus).   Home care  These home care steps can help you manage GERD:  · Maintain · Black or tarry stools (from digested blood)  · More saliva (watering of the mouth) than usual  · Weight loss of more than 3% to 5% of your total body weight in a month  · Hoarseness or sore throat that won’t go away  · Choking, coughing, or wheezing   Da

## 2018-03-21 NOTE — PROGRESS NOTES
Ric Ochoa is a 79year old female. HPI:     HTN:   Stable. Severity is mild, patient is on one agent to control her hypertension. Pt has been taking medications as instructed, no medication side effects.    Diet: Tries to watch sodium intak Diagnosis Date   • Cataract    • Cellulitis of right thigh 10/2/2017   • Class 1 obesity due to excess calories without serious comorbidity with body mass index (BMI) of 34.0 to 34.9 in adult 3/21/2018   • Diverticulosis of large intestine    • Esophagea 12/03/2014      EXAM:   /68 (BP Location: Left arm, Patient Position: Sitting, Cuff Size: large)   Pulse 84   Resp 16   Ht 66.14\"   Wt 214 lb 9.6 oz   BMI 34.49 kg/m²   GENERAL: NAD, pleasant, normal communication, overweight WF  SKIN: no visible ra 8. 3-10.3 mg/dL 9.2  9.4   ALKALINE PHOSPHATASE       U/L 89  93   AST (SGOT)      15-41 U/L 18  14 (L)   ALT (SGPT)      14-54 U/L 20  22   Total Bilirubin      0.1-2.0 mg/dL 0.4  0.4   TOTAL PROTEIN      6.1-8.3 g/dL 7.4  7.2   Albumin      3.5-4.8 (20 mg total) by mouth nightly. Dispense: 90 tablet; Refill: 1    3. Low HDL (under 40)  As above in #2.    - COMP METABOLIC PANEL (14); Future  - LIPID PANEL; Future  - simvastatin 20 MG Oral Tab; Take 1 tablet (20 mg total) by mouth nightly.   Dispense:

## 2018-04-28 ENCOUNTER — LAB ENCOUNTER (OUTPATIENT)
Dept: LAB | Facility: HOSPITAL | Age: 68
End: 2018-04-28
Attending: FAMILY MEDICINE
Payer: COMMERCIAL

## 2018-04-28 DIAGNOSIS — E78.6 LOW HDL (UNDER 40): ICD-10-CM

## 2018-04-28 DIAGNOSIS — I10 ESSENTIAL HYPERTENSION, BENIGN: ICD-10-CM

## 2018-04-28 DIAGNOSIS — R73.01 IMPAIRED FASTING GLUCOSE: ICD-10-CM

## 2018-04-28 DIAGNOSIS — E78.2 MIXED HYPERLIPIDEMIA: ICD-10-CM

## 2018-04-28 PROCEDURE — 84439 ASSAY OF FREE THYROXINE: CPT

## 2018-04-28 PROCEDURE — 80050 GENERAL HEALTH PANEL: CPT

## 2018-04-28 PROCEDURE — 80061 LIPID PANEL: CPT

## 2018-04-28 PROCEDURE — 83036 HEMOGLOBIN GLYCOSYLATED A1C: CPT

## 2018-04-28 PROCEDURE — 36415 COLL VENOUS BLD VENIPUNCTURE: CPT

## 2018-05-01 ENCOUNTER — TELEPHONE (OUTPATIENT)
Dept: FAMILY MEDICINE CLINIC | Facility: CLINIC | Age: 68
End: 2018-05-01

## 2018-05-01 NOTE — TELEPHONE ENCOUNTER
----- Message from Moises Richards DO sent at 4/30/2018  2:30 PM CDT -----  Please call patient: A1c, which is a marker for diabetes, was added to the existing specimen due to elevated fasting blood sugar, await results.   Rest of blood test results are no

## 2018-05-01 NOTE — TELEPHONE ENCOUNTER
LM on private voicemail, per signed consent, with test results. Pt advised to call the office with any questions or concerns.

## 2018-05-01 NOTE — TELEPHONE ENCOUNTER
----- Message from Mariajose Rodrigues DO sent at 4/30/2018 10:55 PM CDT -----  Please see previous result note. A1c was added on, prediabetes has improved.

## 2018-05-07 ENCOUNTER — TELEPHONE (OUTPATIENT)
Dept: FAMILY MEDICINE CLINIC | Facility: CLINIC | Age: 68
End: 2018-05-07

## 2018-05-07 DIAGNOSIS — Z12.39 SCREENING FOR MALIGNANT NEOPLASM OF BREAST: Primary | ICD-10-CM

## 2018-05-21 ENCOUNTER — HOSPITAL ENCOUNTER (OUTPATIENT)
Dept: MAMMOGRAPHY | Age: 68
Discharge: HOME | End: 2018-05-21
Attending: FAMILY MEDICINE
Payer: COMMERCIAL

## 2018-05-21 DIAGNOSIS — Z12.39 SCREENING FOR MALIGNANT NEOPLASM OF BREAST: ICD-10-CM

## 2018-05-21 PROCEDURE — 77067 SCR MAMMO BI INCL CAD: CPT | Performed by: FAMILY MEDICINE

## 2018-05-21 PROCEDURE — 77063 BREAST TOMOSYNTHESIS BI: CPT | Performed by: FAMILY MEDICINE

## 2018-06-08 ENCOUNTER — OFFICE VISIT (OUTPATIENT)
Dept: FAMILY MEDICINE CLINIC | Facility: CLINIC | Age: 68
End: 2018-06-08

## 2018-06-08 VITALS
SYSTOLIC BLOOD PRESSURE: 114 MMHG | HEIGHT: 66 IN | BODY MASS INDEX: 34.33 KG/M2 | WEIGHT: 213.63 LBS | HEART RATE: 76 BPM | DIASTOLIC BLOOD PRESSURE: 64 MMHG

## 2018-06-08 DIAGNOSIS — M16.11 PRIMARY OSTEOARTHRITIS OF RIGHT HIP: ICD-10-CM

## 2018-06-08 DIAGNOSIS — M16.12 PRIMARY OSTEOARTHRITIS OF LEFT HIP: ICD-10-CM

## 2018-06-08 DIAGNOSIS — Z00.00 ROUTINE GENERAL MEDICAL EXAMINATION AT A HEALTH CARE FACILITY: Primary | ICD-10-CM

## 2018-06-08 DIAGNOSIS — M25.551 RIGHT HIP PAIN: ICD-10-CM

## 2018-06-08 PROCEDURE — 99397 PER PM REEVAL EST PAT 65+ YR: CPT | Performed by: FAMILY MEDICINE

## 2018-06-08 NOTE — PROGRESS NOTES
HPI:   Igor Diaz is a 79year old female who presents for her annual wellness visit. Symptoms: denies discharge, itching, burning or dysuria.    s/p hyst  Sexually active: no   Last colonoscopy: UTD  Last mammogram: UTD    Right hip pain:  Pain f Oral Tab Take 1 tablet (100 mg total) by mouth daily. Disp: 90 tablet Rfl: 1   simvastatin 20 MG Oral Tab Take 1 tablet (20 mg total) by mouth nightly.  Disp: 90 tablet Rfl: 1   Levothyroxine Sodium 75 MCG Oral Tab Take 1 tablet (75 mcg total) by mouth [employer]. Marital Status: . Children: 1. Exercise: elliptical 4 mins once a week.   Diet: trying to closely watch her diet     REVIEW OF SYSTEMS:   GENERAL: denies fevers, weakness, trouble sleeping or weight changes  SKIN: denies any un Influenza Vaccine, No Preserv, 3YR +                          11/16/2016      Pneumococcal (Prevnar 13)                          05/08/2017      TDAP                  12/03/2014          ASSESSMENT AND PLAN:   Horace Peterson is a 79year old female who to return for complete physical yearly.

## 2018-06-08 NOTE — PATIENT INSTRUCTIONS
Routine Healthcare for Women   Routine checkups can find treatable problems early. For many medical problems, early treatment can help prevent more serious complications. The value of checkups and how often you have them depend mainly on your age.  Your per history of high cholesterol. Colorectal cancer test: if you are 48 or older.  Recommended tests include a yearly test for blood in the stool, called the fecal occult blood test (FOBT) or fecal immunochemical test (FIT), and one of the following tests:   s history. Many other tests are often done at routine checkups, but there is no current evidence that they are helpful as routine screening tests for healthy women. Examples of such tests are a CBC (complete blood count), thyroid tests, and urine tests.  Wh medical condition, such as diabetes. Varicella (chickenpox) if you have never had chickenpox. Zoster (shingles) vaccine: if you are 50 or older. The vaccine can help prevent shingles. It can also reduce the pain caused by shingles.    What other things

## 2018-06-09 PROBLEM — M16.12 PRIMARY OSTEOARTHRITIS OF LEFT HIP: Status: ACTIVE | Noted: 2018-06-09

## 2018-06-09 PROBLEM — M25.551 RIGHT HIP PAIN: Status: ACTIVE | Noted: 2018-06-09

## 2018-06-09 PROBLEM — M16.11 PRIMARY OSTEOARTHRITIS OF RIGHT HIP: Status: ACTIVE | Noted: 2018-06-09

## 2018-06-12 ENCOUNTER — HOSPITAL ENCOUNTER (OUTPATIENT)
Dept: GENERAL RADIOLOGY | Age: 68
Discharge: HOME | End: 2018-06-12
Attending: FAMILY MEDICINE
Payer: COMMERCIAL

## 2018-06-12 DIAGNOSIS — M16.12 PRIMARY OSTEOARTHRITIS OF LEFT HIP: ICD-10-CM

## 2018-06-12 DIAGNOSIS — M25.551 RIGHT HIP PAIN: ICD-10-CM

## 2018-06-12 DIAGNOSIS — M16.11 PRIMARY OSTEOARTHRITIS OF RIGHT HIP: ICD-10-CM

## 2018-06-12 PROCEDURE — 73523 X-RAY EXAM HIPS BI 5/> VIEWS: CPT | Performed by: FAMILY MEDICINE

## 2018-06-20 ENCOUNTER — TELEPHONE (OUTPATIENT)
Dept: FAMILY MEDICINE CLINIC | Facility: CLINIC | Age: 68
End: 2018-06-20

## 2018-06-20 NOTE — TELEPHONE ENCOUNTER
----- Message from Vivien Raya DO sent at 6/13/2018  8:54 PM CDT -----  Please call patient: X-ray reports hypertrophic degenerative changes of both hips and sacroiliac joints.   Please encourage patient to make appointment to see orthopedic specialist

## 2018-06-20 NOTE — TELEPHONE ENCOUNTER
Spoke to patient with results/instructions.   She states she will be calling the ortho tomorrow for appt

## 2018-06-20 NOTE — TELEPHONE ENCOUNTER
Lynda Parker would like to see if someone can call her and let her know the test result of her hip xray she had done last week, please call Lynda Parker at [phone]

## 2018-07-30 DIAGNOSIS — E03.9 HYPOTHYROIDISM (ACQUIRED): ICD-10-CM

## 2018-07-30 DIAGNOSIS — Z79.899 ENCOUNTER FOR LONG-TERM CURRENT USE OF MEDICATION: ICD-10-CM

## 2018-07-31 RX ORDER — LEVOTHYROXINE SODIUM 0.07 MG/1
TABLET ORAL
Qty: 90 TABLET | Refills: 2 | Status: SHIPPED | OUTPATIENT
Start: 2018-07-31 | End: 2019-04-29

## 2018-08-22 ENCOUNTER — OFFICE VISIT (OUTPATIENT)
Dept: FAMILY MEDICINE CLINIC | Facility: CLINIC | Age: 68
End: 2018-08-22

## 2018-08-22 VITALS
TEMPERATURE: 98 F | HEIGHT: 66 IN | HEART RATE: 80 BPM | WEIGHT: 212 LBS | RESPIRATION RATE: 16 BRPM | SYSTOLIC BLOOD PRESSURE: 145 MMHG | DIASTOLIC BLOOD PRESSURE: 67 MMHG | BODY MASS INDEX: 34.07 KG/M2

## 2018-08-22 DIAGNOSIS — F40.240 CLAUSTROPHOBIA: ICD-10-CM

## 2018-08-22 DIAGNOSIS — R42 DIZZINESS: Primary | ICD-10-CM

## 2018-08-22 DIAGNOSIS — R26.81 UNSTEADY GAIT: ICD-10-CM

## 2018-08-22 DIAGNOSIS — R26.89 IMBALANCE: ICD-10-CM

## 2018-08-22 DIAGNOSIS — R51.9 ACUTE NONINTRACTABLE HEADACHE, UNSPECIFIED HEADACHE TYPE: ICD-10-CM

## 2018-08-22 PROCEDURE — 99214 OFFICE O/P EST MOD 30 MIN: CPT | Performed by: FAMILY MEDICINE

## 2018-08-22 RX ORDER — DIAZEPAM 5 MG/1
TABLET ORAL
Qty: 2 TABLET | Refills: 0 | Status: SHIPPED | OUTPATIENT
Start: 2018-08-22 | End: 2018-09-19 | Stop reason: ALTCHOICE

## 2018-08-22 NOTE — PROGRESS NOTES
Pascual Lenz is a 79year old female. HPI:       Had a funny feeling coming up from her neck to her face, pulled over, (she was driving) because she was dizzy with spinning sensation that lasted a minute or less.   Was driving with head straight, was (gastroesophageal reflux disease)     Essential hypertension, benign     Localized osteoarthritis of hip     Hip pain, bilateral     Prediabetes     Knee pain     Primary osteoarthritis of both knees     Hypercholesterolemia     Non morbid obesity due to e REPLACEMENT SURGERY  1983: OOPHORECTOMY Left      Comment: Left ovary removed.   5-16-14: UPPER GI ENDOSCOPY,EXAM   Social History:  Smoking status: Former Smoker                                                              Packs/day: 2.00      Years: 3.00 adenopathy. Neurological: She is alert and oriented to person, place, and time. She displays normal reflexes. No cranial nerve deficit, sensory deficit or motor deficit. Gait normal.   Psychiatric: She has a normal mood and affect.  Her behavior is normal

## 2018-08-24 ENCOUNTER — HOSPITAL ENCOUNTER (OUTPATIENT)
Dept: MRI IMAGING | Age: 68
Discharge: HOME | End: 2018-08-24
Attending: FAMILY MEDICINE
Payer: COMMERCIAL

## 2018-08-24 DIAGNOSIS — R26.89 IMBALANCE: ICD-10-CM

## 2018-08-24 DIAGNOSIS — R51.9 ACUTE NONINTRACTABLE HEADACHE, UNSPECIFIED HEADACHE TYPE: ICD-10-CM

## 2018-08-24 DIAGNOSIS — R26.81 UNSTEADY GAIT: ICD-10-CM

## 2018-08-24 DIAGNOSIS — R42 DIZZINESS: ICD-10-CM

## 2018-08-24 LAB — CREAT SERPL-MCNC: 0.9 MG/DL (ref 0.55–1.02)

## 2018-08-24 PROCEDURE — 82565 ASSAY OF CREATININE: CPT

## 2018-08-24 PROCEDURE — 70553 MRI BRAIN STEM W/O & W/DYE: CPT | Performed by: FAMILY MEDICINE

## 2018-08-24 PROCEDURE — A9576 INJ PROHANCE MULTIPACK: HCPCS | Performed by: FAMILY MEDICINE

## 2018-08-25 PROBLEM — R42 DIZZINESS: Status: ACTIVE | Noted: 2018-08-25

## 2018-08-25 PROBLEM — R26.81 UNSTEADY GAIT: Status: ACTIVE | Noted: 2018-08-25

## 2018-08-25 PROBLEM — R51.9 ACUTE NONINTRACTABLE HEADACHE: Status: ACTIVE | Noted: 2018-08-25

## 2018-08-25 PROBLEM — R26.89 IMBALANCE: Status: ACTIVE | Noted: 2018-08-25

## 2018-08-25 PROBLEM — F40.240 CLAUSTROPHOBIA: Status: ACTIVE | Noted: 2018-08-25

## 2018-08-27 ENCOUNTER — TELEPHONE (OUTPATIENT)
Dept: FAMILY MEDICINE CLINIC | Facility: CLINIC | Age: 68
End: 2018-08-27

## 2018-08-27 DIAGNOSIS — R90.82 WHITE MATTER ABNORMALITY ON MRI OF BRAIN: Primary | ICD-10-CM

## 2018-08-27 DIAGNOSIS — I25.10 CORONARY ARTERY DISEASE INVOLVING NATIVE HEART, ANGINA PRESENCE UNSPECIFIED, UNSPECIFIED VESSEL OR LESION TYPE: ICD-10-CM

## 2018-08-27 DIAGNOSIS — I73.9 PERIPHERAL ARTERIAL DISEASE (HCC): ICD-10-CM

## 2018-08-27 NOTE — TELEPHONE ENCOUNTER
----- Message from Deni Sawyer DO sent at 8/25/2018  1:31 PM CDT -----  Please call patient: There are some white matter changes of the brain, no evidence of stroke.   Recommend consultation with [facility] group neurologist, Dr. Allie Montaño or one of

## 2018-08-28 NOTE — TELEPHONE ENCOUNTER
Pt informed of test results and recommendations. Pt verbalized understanding and had no questions at this time.   All orders in the system and contact information left on patient voicemail at her request.

## 2018-09-05 ENCOUNTER — HOSPITAL ENCOUNTER (OUTPATIENT)
Dept: ULTRASOUND IMAGING | Age: 68
Discharge: HOME | End: 2018-09-05
Attending: FAMILY MEDICINE
Payer: COMMERCIAL

## 2018-09-05 DIAGNOSIS — I73.9 PERIPHERAL ARTERIAL DISEASE (HCC): ICD-10-CM

## 2018-09-05 DIAGNOSIS — I25.10 CORONARY ARTERY DISEASE INVOLVING NATIVE HEART, ANGINA PRESENCE UNSPECIFIED, UNSPECIFIED VESSEL OR LESION TYPE: ICD-10-CM

## 2018-09-05 PROCEDURE — 93880 EXTRACRANIAL BILAT STUDY: CPT | Performed by: FAMILY MEDICINE

## 2018-09-11 ENCOUNTER — TELEPHONE (OUTPATIENT)
Dept: FAMILY MEDICINE CLINIC | Facility: CLINIC | Age: 68
End: 2018-09-11

## 2018-09-11 NOTE — TELEPHONE ENCOUNTER
I left a message on the patient's confidential voice mail stating that per Dr. Sulema Lindsay, there is no significant stenosis/narrowing of the carotid arteries. I asked the patient to call the office with any questions regarding this message.

## 2018-09-11 NOTE — TELEPHONE ENCOUNTER
----- Message from Debbie Alston DO sent at 9/8/2018 10:40 PM CDT -----  Please call patient: There is no significant stenosis/narrowing of the carotid arteries.

## 2018-09-19 ENCOUNTER — OFFICE VISIT (OUTPATIENT)
Dept: NEUROLOGY | Facility: CLINIC | Age: 68
End: 2018-09-19

## 2018-09-19 VITALS
BODY MASS INDEX: 34 KG/M2 | SYSTOLIC BLOOD PRESSURE: 124 MMHG | WEIGHT: 210 LBS | HEART RATE: 78 BPM | RESPIRATION RATE: 18 BRPM | DIASTOLIC BLOOD PRESSURE: 62 MMHG

## 2018-09-19 DIAGNOSIS — G43.109 MIGRAINE WITH AURA AND WITHOUT STATUS MIGRAINOSUS, NOT INTRACTABLE: ICD-10-CM

## 2018-09-19 DIAGNOSIS — H93.8X2 EAR FULLNESS, LEFT: ICD-10-CM

## 2018-09-19 DIAGNOSIS — H81.399 PERIPHERAL VERTIGO, UNSPECIFIED LATERALITY: Primary | ICD-10-CM

## 2018-09-19 PROCEDURE — 99244 OFF/OP CNSLTJ NEW/EST MOD 40: CPT | Performed by: OTHER

## 2018-09-19 NOTE — PATIENT INSTRUCTIONS
After your visit at the [facility] office  today,  please direct any follow up questions or medication needs to the staff in our  [place] office so that your concerns may be promptly addressed.   We are available through W-21 or at the numbers below: Tests:    If your physician has ordered radiology tests such as MRI or CT scans, do not schedule the test until this office has notified you that the test has been approved by your insurer. Depending on your insurance carrier, approval may take 3-10 days. • Failure to follow above steps may result in the delay of form completion.

## 2018-09-19 NOTE — PROGRESS NOTES
[address] [address]   Neurology- INITIAL CLINIC VISIT  2018, 10:36 AM     Lakisha Gonzalez Patient Status:  No patient class for patient encounter    1950 MRN [id number]   Location [address] Osteoarthritis  1979: Spontaneous pneumothorax      Comment:  right side  12/7/2016: Status post total left knee replacement  No date: Visual impairment      Comment:  glasses     Past Surgical History:  Past Surgical History:  1972: APPENDECTOMY  No date: UNITS Oral Cap, Take 1 capsule by mouth daily.   , Disp: , Rfl:   •  amoxicillin 500 MG Oral Tab, Take four 500 mg tablets by mouth one hour before dental procedure, Disp: 12 tablet, Rfl: 0      Review of Systems:   A comprehensive 10 point review of system brief lightheadedness, as well as subsequent a few days of intermittent lightheadedness, and follow by a headache. The headache had a few migrainous features, including photophobia, and this is very possibly an atypical migraine with aura.  No other posteri

## 2018-09-19 NOTE — PROGRESS NOTES
Patient here for evaluation of 2 episodes of dizziness. Had imaging done, here to discuss the next steps.

## 2018-11-16 DIAGNOSIS — E78.2 MIXED HYPERLIPIDEMIA: ICD-10-CM

## 2018-11-16 DIAGNOSIS — I10 ESSENTIAL HYPERTENSION, BENIGN: ICD-10-CM

## 2018-11-16 DIAGNOSIS — E78.6 LOW HDL (UNDER 40): ICD-10-CM

## 2018-11-16 DIAGNOSIS — Z79.899 ENCOUNTER FOR LONG-TERM CURRENT USE OF MEDICATION: ICD-10-CM

## 2018-11-16 RX ORDER — SIMVASTATIN 20 MG
TABLET ORAL
Qty: 90 TABLET | Refills: 0 | Status: SHIPPED | OUTPATIENT
Start: 2018-11-16 | End: 2018-12-20

## 2018-11-16 RX ORDER — LOSARTAN POTASSIUM 100 MG/1
TABLET ORAL
Qty: 90 TABLET | Refills: 0 | Status: SHIPPED | OUTPATIENT
Start: 2018-11-16 | End: 2018-12-20

## 2018-12-20 ENCOUNTER — OFFICE VISIT (OUTPATIENT)
Dept: FAMILY MEDICINE CLINIC | Facility: CLINIC | Age: 68
End: 2018-12-20

## 2018-12-20 VITALS
DIASTOLIC BLOOD PRESSURE: 72 MMHG | WEIGHT: 209 LBS | HEART RATE: 82 BPM | BODY MASS INDEX: 34 KG/M2 | SYSTOLIC BLOOD PRESSURE: 128 MMHG

## 2018-12-20 DIAGNOSIS — Z79.899 ENCOUNTER FOR LONG-TERM CURRENT USE OF MEDICATION: ICD-10-CM

## 2018-12-20 DIAGNOSIS — Z23 IMMUNIZATION DUE: ICD-10-CM

## 2018-12-20 DIAGNOSIS — E78.2 MIXED HYPERLIPIDEMIA: ICD-10-CM

## 2018-12-20 DIAGNOSIS — I10 ESSENTIAL HYPERTENSION, BENIGN: Primary | ICD-10-CM

## 2018-12-20 DIAGNOSIS — E78.6 LOW HDL (UNDER 40): ICD-10-CM

## 2018-12-20 PROCEDURE — 90471 IMMUNIZATION ADMIN: CPT | Performed by: FAMILY MEDICINE

## 2018-12-20 PROCEDURE — 99214 OFFICE O/P EST MOD 30 MIN: CPT | Performed by: FAMILY MEDICINE

## 2018-12-20 PROCEDURE — 90653 IIV ADJUVANT VACCINE IM: CPT | Performed by: FAMILY MEDICINE

## 2018-12-20 RX ORDER — SIMVASTATIN 20 MG
20 TABLET ORAL NIGHTLY
Qty: 90 TABLET | Refills: 3 | Status: SHIPPED | OUTPATIENT
Start: 2018-12-20 | End: 2018-12-20

## 2018-12-20 RX ORDER — LOSARTAN POTASSIUM 100 MG/1
100 TABLET ORAL NIGHTLY
Qty: 90 TABLET | Refills: 3 | Status: SHIPPED | OUTPATIENT
Start: 2018-12-20 | End: 2018-12-20

## 2018-12-20 RX ORDER — SIMVASTATIN 20 MG
20 TABLET ORAL NIGHTLY
Qty: 90 TABLET | Refills: 3 | Status: SHIPPED | OUTPATIENT
Start: 2018-12-20 | End: 2020-03-02

## 2018-12-20 RX ORDER — LOSARTAN POTASSIUM 100 MG/1
100 TABLET ORAL NIGHTLY
Qty: 90 TABLET | Refills: 3 | Status: SHIPPED | OUTPATIENT
Start: 2018-12-20 | End: 2020-03-02

## 2018-12-20 NOTE — PROGRESS NOTES
Romeo Savage is a 79year old female. HPI:     Dizziness:  Resolved. HTN:   Stable. Severity is mild, patient is on one agent to control her hypertension. Pt has been taking medications as instructed, no medication side effects.    Diet: excess calories without serious comorbidity with body mass index (BMI) of 34.0 to 34.9 in adult 3/21/2018   • Diverticulosis of large intestine    • Esophageal reflux    • Essential hypertension, benign 10/24/2014   • Gastric polyp 5-16-14   • H/O colonosc History  Administered            Date(s) Administered    FLU VACC High Dose 65 YRS & Older PRSV Free (77257)                          01/06/2018      FLUAD High Dose 72 yr and older (26492)                          12/20/2018      FLUZONE 3 Yrs+ Quad Prsv Dioxide, Total      22.0 - 32.0 mmol/L 26.0 27.0 29.0   GFR      >=60  79 79   CHOLESTEROL, TOTAL      <200 mg/dL 145 152 165   Triglycerides      <150 mg/dL 54 51 59   HDL Cholesterol      >45 mg/dL 69 73 65   LDL Cholesterol Calc      <130 mg/dL 65 69 88 Sig: Take 1 tablet (100 mg total) by mouth nightly. Imaging & Consults:  FLU VACCINE ADJUVANT IM    Return in about 6 months (around 6/20/2019) for Annual Wellness Visit and as needed or indicated.

## 2019-04-11 ENCOUNTER — TELEPHONE (OUTPATIENT)
Dept: FAMILY MEDICINE CLINIC | Facility: CLINIC | Age: 69
End: 2019-04-11

## 2019-04-11 DIAGNOSIS — K21.9 GASTROESOPHAGEAL REFLUX DISEASE, ESOPHAGITIS PRESENCE NOT SPECIFIED: Primary | ICD-10-CM

## 2019-04-11 DIAGNOSIS — H53.8 VISION BLURRED: ICD-10-CM

## 2019-04-11 NOTE — TELEPHONE ENCOUNTER
Reason for the order/referral: BLURRY VISION (R) EYE   PCP: Tess Raman   Refer to Provider (first and last name): Frederic Morton   Specialty: OPHTHAMOLOGY   Patient Insurance: Payor: P BL/ADVCAP / Plan: DNU12 BA / Product Type: HMO /   Duration/Summary o

## 2019-04-29 DIAGNOSIS — E03.9 HYPOTHYROIDISM (ACQUIRED): ICD-10-CM

## 2019-04-29 DIAGNOSIS — Z79.899 ENCOUNTER FOR LONG-TERM CURRENT USE OF MEDICATION: ICD-10-CM

## 2019-04-30 RX ORDER — LEVOTHYROXINE SODIUM 0.07 MG/1
TABLET ORAL
Qty: 30 TABLET | Refills: 0 | Status: SHIPPED | OUTPATIENT
Start: 2019-04-30 | End: 2019-06-03

## 2019-04-30 NOTE — TELEPHONE ENCOUNTER
Doc,    Pt last labs 4/2018. Would you want to order thyroid labs?   30 day refill sent of the levothyroxine

## 2019-05-08 ENCOUNTER — TELEPHONE (OUTPATIENT)
Dept: FAMILY MEDICINE CLINIC | Facility: CLINIC | Age: 69
End: 2019-05-08

## 2019-05-08 DIAGNOSIS — K21.9 GASTROESOPHAGEAL REFLUX DISEASE, ESOPHAGITIS PRESENCE NOT SPECIFIED: Primary | ICD-10-CM

## 2019-06-03 DIAGNOSIS — Z79.899 ENCOUNTER FOR LONG-TERM CURRENT USE OF MEDICATION: ICD-10-CM

## 2019-06-03 DIAGNOSIS — E03.9 HYPOTHYROIDISM (ACQUIRED): ICD-10-CM

## 2019-06-04 RX ORDER — LEVOTHYROXINE SODIUM 0.07 MG/1
TABLET ORAL
Qty: 30 TABLET | Refills: 0 | Status: SHIPPED | OUTPATIENT
Start: 2019-06-04 | End: 2019-06-14

## 2019-06-14 ENCOUNTER — OFFICE VISIT (OUTPATIENT)
Dept: FAMILY MEDICINE CLINIC | Facility: CLINIC | Age: 69
End: 2019-06-14

## 2019-06-14 VITALS
DIASTOLIC BLOOD PRESSURE: 62 MMHG | WEIGHT: 211 LBS | SYSTOLIC BLOOD PRESSURE: 120 MMHG | BODY MASS INDEX: 33.91 KG/M2 | HEIGHT: 66 IN | HEART RATE: 86 BPM

## 2019-06-14 DIAGNOSIS — I10 ESSENTIAL HYPERTENSION, BENIGN: ICD-10-CM

## 2019-06-14 DIAGNOSIS — R73.03 PREDIABETES: ICD-10-CM

## 2019-06-14 DIAGNOSIS — Z78.0 POSTMENOPAUSAL: ICD-10-CM

## 2019-06-14 DIAGNOSIS — Z00.00 ROUTINE GENERAL MEDICAL EXAMINATION AT A HEALTH CARE FACILITY: Primary | ICD-10-CM

## 2019-06-14 DIAGNOSIS — Z12.31 ENCOUNTER FOR SCREENING MAMMOGRAM FOR MALIGNANT NEOPLASM OF BREAST: ICD-10-CM

## 2019-06-14 DIAGNOSIS — E03.9 HYPOTHYROIDISM (ACQUIRED): ICD-10-CM

## 2019-06-14 DIAGNOSIS — E78.6 LOW HDL (UNDER 40): ICD-10-CM

## 2019-06-14 DIAGNOSIS — E66.09 CLASS 1 OBESITY DUE TO EXCESS CALORIES WITH SERIOUS COMORBIDITY AND BODY MASS INDEX (BMI) OF 34.0 TO 34.9 IN ADULT: ICD-10-CM

## 2019-06-14 DIAGNOSIS — Z79.899 ENCOUNTER FOR LONG-TERM CURRENT USE OF MEDICATION: ICD-10-CM

## 2019-06-14 DIAGNOSIS — E78.2 MIXED HYPERLIPIDEMIA: ICD-10-CM

## 2019-06-14 PROCEDURE — 99397 PER PM REEVAL EST PAT 65+ YR: CPT | Performed by: FAMILY MEDICINE

## 2019-06-14 PROCEDURE — 99214 OFFICE O/P EST MOD 30 MIN: CPT | Performed by: FAMILY MEDICINE

## 2019-06-14 RX ORDER — LEVOTHYROXINE SODIUM 0.07 MG/1
75 TABLET ORAL
Qty: 90 TABLET | Refills: 3 | Status: SHIPPED | OUTPATIENT
Start: 2019-06-14 | End: 2020-05-27

## 2019-06-14 NOTE — PROGRESS NOTES
HPI:   Sandy Horowitz is a 81year old female   Symptoms: denies discharge, itching, burning or dysuria. H/o hysterectomy and unilateral oophorectomy.   Sexually active: no   Last colonoscopy: UTD, due 1/2026  Last mammogram: 5/2018  STI history: no Cellulitis of right thigh     Abscess of right thigh     Class 1 obesity due to excess calories without serious comorbidity with body mass index (BMI) of 34.0 to 34.9 in adult     Routine general medical examination at a health care facility     Right hip reflux    • Essential hypertension, benign 10/24/2014   • Flatulence/gas pain/belching    • Gastric polyp 5-16-14   • H/O colonoscopy with polypectomy 1/16/16   • Heartburn    • High cholesterol    • Hypercholesterolemia 11/16/2016   • Hypothyroidism (acqu pain, bowel movement changes, blood in stool  : denies urinary problems, vaginal discharge or discomfort  MUSCULOSKELETAL: denies joint pain or stiffness  NEURO: denies headaches, tingling or dizziness  PSYCH: denies depression or anxiety  HEMATOLOGIC: d 80 103 (H)   BUN      8 - 20 mg/dL 17 16 20   CREATININE      0.55 - 1.02 mg/dL 0.77 0.79 0.79   GFR, Non-      >=60 81     GFR, -American      >=60 93     CALCIUM      8.3 - 10.3 mg/dL 9.2 9.2 9.4   ALKALINE PHOSPHATASE      55 - 14 prevention. Routine health profile labs pending. Age-appropriate calcium and vitamin D intake discussed. - LIPID PANEL; Future  - CBC WITH DIFFERENTIAL WITH PLATELET; Future  - COMP METABOLIC PANEL (14); Future  - TSH+FREE T4; Future    2.  Encounter [E]      Hemoglobin A1C [E]      Meds & Refills for this Visit:  Requested Prescriptions     Signed Prescriptions Disp Refills   • Levothyroxine Sodium 75 MCG Oral Tab 90 tablet 3     Sig: Take 1 tablet (75 mcg total) by mouth before breakfast.       Retur

## 2019-06-18 ENCOUNTER — HOSPITAL ENCOUNTER (OUTPATIENT)
Dept: MAMMOGRAPHY | Age: 69
Discharge: HOME | End: 2019-06-18
Attending: FAMILY MEDICINE
Payer: COMMERCIAL

## 2019-06-18 DIAGNOSIS — Z12.31 ENCOUNTER FOR SCREENING MAMMOGRAM FOR MALIGNANT NEOPLASM OF BREAST: ICD-10-CM

## 2019-06-18 PROCEDURE — 77067 SCR MAMMO BI INCL CAD: CPT | Performed by: FAMILY MEDICINE

## 2019-06-18 PROCEDURE — 77063 BREAST TOMOSYNTHESIS BI: CPT | Performed by: FAMILY MEDICINE

## 2019-06-19 ENCOUNTER — HOSPITAL ENCOUNTER (OUTPATIENT)
Dept: BONE DENSITY | Age: 69
Discharge: HOME | End: 2019-06-19
Attending: FAMILY MEDICINE
Payer: COMMERCIAL

## 2019-06-19 DIAGNOSIS — Z78.0 POSTMENOPAUSAL: ICD-10-CM

## 2019-06-19 PROCEDURE — 77080 DXA BONE DENSITY AXIAL: CPT | Performed by: FAMILY MEDICINE

## 2019-06-29 ENCOUNTER — LAB ENCOUNTER (OUTPATIENT)
Dept: LAB | Facility: HOSPITAL | Age: 69
End: 2019-06-29
Attending: FAMILY MEDICINE
Payer: COMMERCIAL

## 2019-06-29 DIAGNOSIS — R73.03 PREDIABETES: ICD-10-CM

## 2019-06-29 DIAGNOSIS — E03.9 HYPOTHYROIDISM (ACQUIRED): ICD-10-CM

## 2019-06-29 DIAGNOSIS — Z00.00 ROUTINE GENERAL MEDICAL EXAMINATION AT A HEALTH CARE FACILITY: ICD-10-CM

## 2019-06-29 LAB
ALBUMIN SERPL-MCNC: 3.4 G/DL (ref 3.4–5)
ALBUMIN/GLOB SERPL: 1 {RATIO} (ref 1–2)
ALP LIVER SERPL-CCNC: 85 U/L (ref 55–142)
ALT SERPL-CCNC: 19 U/L (ref 13–56)
ANION GAP SERPL CALC-SCNC: 3 MMOL/L (ref 0–18)
AST SERPL-CCNC: 17 U/L (ref 15–37)
BASOPHILS # BLD AUTO: 0.03 X10(3) UL (ref 0–0.2)
BASOPHILS NFR BLD AUTO: 0.6 %
BILIRUB SERPL-MCNC: 0.6 MG/DL (ref 0.1–2)
BUN BLD-MCNC: 15 MG/DL (ref 7–18)
BUN/CREAT SERPL: 18.8 (ref 10–20)
CALCIUM BLD-MCNC: 9.4 MG/DL (ref 8.5–10.1)
CHLORIDE SERPL-SCNC: 109 MMOL/L (ref 98–112)
CHOLEST SMN-MCNC: 142 MG/DL (ref ?–200)
CO2 SERPL-SCNC: 29 MMOL/L (ref 21–32)
CREAT BLD-MCNC: 0.8 MG/DL (ref 0.55–1.02)
DEPRECATED RDW RBC AUTO: 47.3 FL (ref 35.1–46.3)
EOSINOPHIL # BLD AUTO: 0.21 X10(3) UL (ref 0–0.7)
EOSINOPHIL NFR BLD AUTO: 4.3 %
ERYTHROCYTE [DISTWIDTH] IN BLOOD BY AUTOMATED COUNT: 14.4 % (ref 11–15)
EST. AVERAGE GLUCOSE BLD GHB EST-MCNC: 131 MG/DL (ref 68–126)
GLOBULIN PLAS-MCNC: 3.5 G/DL (ref 2.8–4.4)
GLUCOSE BLD-MCNC: 102 MG/DL (ref 70–99)
HBA1C MFR BLD HPLC: 6.2 % (ref ?–5.7)
HCT VFR BLD AUTO: 43.6 % (ref 35–48)
HDLC SERPL-MCNC: 70 MG/DL (ref 40–59)
HGB BLD-MCNC: 13.8 G/DL (ref 12–16)
IMM GRANULOCYTES # BLD AUTO: 0.01 X10(3) UL (ref 0–1)
IMM GRANULOCYTES NFR BLD: 0.2 %
LDLC SERPL CALC-MCNC: 63 MG/DL (ref ?–100)
LYMPHOCYTES # BLD AUTO: 1.48 X10(3) UL (ref 1–4)
LYMPHOCYTES NFR BLD AUTO: 30.3 %
M PROTEIN MFR SERPL ELPH: 6.9 G/DL (ref 6.4–8.2)
MCH RBC QN AUTO: 28.3 PG (ref 26–34)
MCHC RBC AUTO-ENTMCNC: 31.7 G/DL (ref 31–37)
MCV RBC AUTO: 89.3 FL (ref 80–100)
MONOCYTES # BLD AUTO: 0.49 X10(3) UL (ref 0.1–1)
MONOCYTES NFR BLD AUTO: 10 %
NEUTROPHILS # BLD AUTO: 2.67 X10 (3) UL (ref 1.5–7.7)
NEUTROPHILS # BLD AUTO: 2.67 X10(3) UL (ref 1.5–7.7)
NEUTROPHILS NFR BLD AUTO: 54.6 %
NONHDLC SERPL-MCNC: 72 MG/DL (ref ?–130)
OSMOLALITY SERPL CALC.SUM OF ELEC: 293 MOSM/KG (ref 275–295)
PLATELET # BLD AUTO: 201 10(3)UL (ref 150–450)
POTASSIUM SERPL-SCNC: 4.4 MMOL/L (ref 3.5–5.1)
RBC # BLD AUTO: 4.88 X10(6)UL (ref 3.8–5.3)
SODIUM SERPL-SCNC: 141 MMOL/L (ref 136–145)
T4 FREE SERPL-MCNC: 1.1 NG/DL (ref 0.8–1.7)
TRIGL SERPL-MCNC: 47 MG/DL (ref 30–149)
TSI SER-ACNC: 1.02 MIU/ML (ref 0.36–3.74)
VLDLC SERPL CALC-MCNC: 9 MG/DL (ref 0–30)
WBC # BLD AUTO: 4.9 X10(3) UL (ref 4–11)

## 2019-06-29 PROCEDURE — 85025 COMPLETE CBC W/AUTO DIFF WBC: CPT

## 2019-06-29 PROCEDURE — 80053 COMPREHEN METABOLIC PANEL: CPT

## 2019-06-29 PROCEDURE — 83036 HEMOGLOBIN GLYCOSYLATED A1C: CPT

## 2019-06-29 PROCEDURE — 84443 ASSAY THYROID STIM HORMONE: CPT

## 2019-06-29 PROCEDURE — 84439 ASSAY OF FREE THYROXINE: CPT

## 2019-06-29 PROCEDURE — 80061 LIPID PANEL: CPT

## 2019-06-29 PROCEDURE — 36415 COLL VENOUS BLD VENIPUNCTURE: CPT

## 2019-07-02 ENCOUNTER — TELEPHONE (OUTPATIENT)
Dept: FAMILY MEDICINE CLINIC | Facility: CLINIC | Age: 69
End: 2019-07-02

## 2019-07-02 NOTE — TELEPHONE ENCOUNTER
----- Message from Kevyn Hines DO sent at 7/1/2019 11:28 PM CDT -----  Please call patient: Prediabetes is a little worse.   Rest of blood test results are normal.  We will discuss results further at her upcoming appointment she has scheduled for this

## 2019-07-02 NOTE — TELEPHONE ENCOUNTER
[facility] results and recommendations. Advised to call office with any questions or concerns.

## 2019-07-22 ENCOUNTER — OFFICE VISIT (OUTPATIENT)
Dept: FAMILY MEDICINE CLINIC | Facility: CLINIC | Age: 69
End: 2019-07-22

## 2019-07-22 VITALS
DIASTOLIC BLOOD PRESSURE: 76 MMHG | HEART RATE: 72 BPM | HEIGHT: 66 IN | SYSTOLIC BLOOD PRESSURE: 122 MMHG | WEIGHT: 208 LBS | BODY MASS INDEX: 33.43 KG/M2

## 2019-07-22 DIAGNOSIS — Z23 NEED FOR VACCINATION: ICD-10-CM

## 2019-07-22 DIAGNOSIS — I10 ESSENTIAL HYPERTENSION, BENIGN: ICD-10-CM

## 2019-07-22 DIAGNOSIS — H25.811 COMBINED FORMS OF AGE-RELATED CATARACT OF RIGHT EYE: ICD-10-CM

## 2019-07-22 DIAGNOSIS — E66.09 CLASS 1 OBESITY DUE TO EXCESS CALORIES WITH SERIOUS COMORBIDITY AND BODY MASS INDEX (BMI) OF 33.0 TO 33.9 IN ADULT: ICD-10-CM

## 2019-07-22 DIAGNOSIS — Z01.818 PREOP EXAMINATION: Primary | ICD-10-CM

## 2019-07-22 PROBLEM — L02.415 ABSCESS OF RIGHT THIGH: Status: RESOLVED | Noted: 2017-10-06 | Resolved: 2019-07-22

## 2019-07-22 PROBLEM — Z00.00 ROUTINE GENERAL MEDICAL EXAMINATION AT A HEALTH CARE FACILITY: Status: RESOLVED | Noted: 2018-06-08 | Resolved: 2019-07-22

## 2019-07-22 PROBLEM — M25.551 RIGHT HIP PAIN: Status: RESOLVED | Noted: 2018-06-09 | Resolved: 2019-07-22

## 2019-07-22 PROBLEM — L03.115 CELLULITIS OF RIGHT THIGH: Status: RESOLVED | Noted: 2017-10-02 | Resolved: 2019-07-22

## 2019-07-22 PROBLEM — R42 DIZZINESS: Status: RESOLVED | Noted: 2018-08-25 | Resolved: 2019-07-22

## 2019-07-22 PROBLEM — R26.81 UNSTEADY GAIT: Status: RESOLVED | Noted: 2018-08-25 | Resolved: 2019-07-22

## 2019-07-22 PROBLEM — R51.9 ACUTE NONINTRACTABLE HEADACHE: Status: RESOLVED | Noted: 2018-08-25 | Resolved: 2019-07-22

## 2019-07-22 PROBLEM — R26.89 IMBALANCE: Status: RESOLVED | Noted: 2018-08-25 | Resolved: 2019-07-22

## 2019-07-22 PROCEDURE — 99214 OFFICE O/P EST MOD 30 MIN: CPT | Performed by: FAMILY MEDICINE

## 2019-07-22 PROCEDURE — 90471 IMMUNIZATION ADMIN: CPT | Performed by: FAMILY MEDICINE

## 2019-07-22 PROCEDURE — 90732 PPSV23 VACC 2 YRS+ SUBQ/IM: CPT | Performed by: FAMILY MEDICINE

## 2019-07-22 RX ORDER — TOBRAMYCIN 3 MG/ML
SOLUTION/ DROPS OPHTHALMIC
Refills: 1 | COMMUNITY
Start: 2019-07-03 | End: 2019-11-01 | Stop reason: ALTCHOICE

## 2019-07-22 RX ORDER — PREDNISOLONE ACETATE 10 MG/ML
SUSPENSION/ DROPS OPHTHALMIC
Refills: 1 | COMMUNITY
Start: 2019-07-03 | End: 2019-11-01 | Stop reason: ALTCHOICE

## 2019-07-22 RX ORDER — BROMFENAC SODIUM 0.7 MG/ML
SOLUTION/ DROPS OPHTHALMIC
Refills: 1 | COMMUNITY
Start: 2019-07-03 | End: 2019-11-01 | Stop reason: ALTCHOICE

## 2019-07-22 NOTE — PROGRESS NOTES
Meera Dickey is a 81year old female.   HPI:       This is a 61-year-old  female with hypertension who presents for preop history and physical with medical clearance as requested by her surgeon Dr. Marissa Franklin prior to right cataract surgery wi Primary osteoarthritis of left hip     Claustrophobia     Mixed hyperlipidemia     Low HDL (under 40)     Encounter for long-term current use of medication     Combined forms of age-related cataract of right eye     Class 1 obesity due to excess calories w 3.00        Pack years: 6        Quit date: 1979        Years since quittin.7      Smokeless tobacco: Never Used    Alcohol use: No      Alcohol/week: 0.0 standard drinks    Drug use: No           REVIEW OF SYSTEMS:   Review of Systems   Constitu no wheezes. She has no rales. Abdominal: Soft. Bowel sounds are normal. She exhibits no distension. There is no tenderness. Lymphadenopathy:     She has no cervical adenopathy. Neurological: She is alert and oriented to person, place, and time.  No cr 3.740 mIU/mL 1.020 1.040 0.916           ASSESSMENT AND PLAN:     Preop examination  (primary encounter diagnosis)  Combined forms of age-related cataract of right eye  Essential hypertension, benign  Class 1 obesity due to excess calories with serious com

## 2019-08-21 DIAGNOSIS — Z79.899 ENCOUNTER FOR LONG-TERM CURRENT USE OF MEDICATION: ICD-10-CM

## 2019-08-21 DIAGNOSIS — I10 ESSENTIAL HYPERTENSION, BENIGN: ICD-10-CM

## 2019-08-22 RX ORDER — LOSARTAN POTASSIUM 100 MG/1
TABLET ORAL
Qty: 90 TABLET | Refills: 3 | OUTPATIENT
Start: 2019-08-22

## 2019-08-28 ENCOUNTER — TELEPHONE (OUTPATIENT)
Dept: FAMILY MEDICINE CLINIC | Facility: CLINIC | Age: 69
End: 2019-08-28

## 2019-08-28 NOTE — TELEPHONE ENCOUNTER
Pt called requesting a refill on losartan 100 MG Oral Tab and needs it sent to the [facility] mail order.

## 2019-09-09 ENCOUNTER — TELEPHONE (OUTPATIENT)
Dept: FAMILY MEDICINE CLINIC | Facility: CLINIC | Age: 69
End: 2019-09-09

## 2019-09-09 DIAGNOSIS — Z96.649 HISTORY OF HIP REPLACEMENT, UNSPECIFIED LATERALITY: Primary | ICD-10-CM

## 2019-09-09 NOTE — TELEPHONE ENCOUNTER
Referral placed per request.  On going referral.    Joshua Iglesias [[id number]]     . Reason for the order/referral: REFERRAL REQUEST   PCP: [email or web address] Tawny Galloway MD   Refer to Provider (first and last name): Arielle Werner MD   Specialty:ORTHOPEDIC YAÑEZ

## 2019-10-30 ENCOUNTER — TELEPHONE (OUTPATIENT)
Dept: FAMILY MEDICINE CLINIC | Facility: CLINIC | Age: 69
End: 2019-10-30

## 2019-10-30 NOTE — TELEPHONE ENCOUNTER
Patient has made pre-op appointment. Paperwork in triage awaiting appointment with Dr. Kristal Tian.

## 2019-10-30 NOTE — TELEPHONE ENCOUNTER
We received a letter for Pre-Op appt. Pt is having surgery on 11/20/19. The paperwork was placed in the triage basket for processing.

## 2019-11-02 ENCOUNTER — TELEPHONE (OUTPATIENT)
Dept: FAMILY MEDICINE CLINIC | Facility: CLINIC | Age: 69
End: 2019-11-02

## 2019-11-11 ENCOUNTER — HOSPITAL ENCOUNTER (OUTPATIENT)
Dept: PHYSICAL THERAPY | Facility: HOSPITAL | Age: 69
Discharge: HOME | End: 2019-11-11
Attending: ORTHOPAEDIC SURGERY
Payer: COMMERCIAL

## 2019-11-11 ENCOUNTER — LABORATORY ENCOUNTER (OUTPATIENT)
Dept: LAB | Facility: HOSPITAL | Age: 69
End: 2019-11-11
Attending: ORTHOPAEDIC SURGERY
Payer: COMMERCIAL

## 2019-11-11 DIAGNOSIS — M16.11 OSTEOARTHRITIS OF RIGHT HIP: ICD-10-CM

## 2019-11-11 PROCEDURE — 86901 BLOOD TYPING SEROLOGIC RH(D): CPT

## 2019-11-11 PROCEDURE — 86900 BLOOD TYPING SEROLOGIC ABO: CPT

## 2019-11-11 PROCEDURE — 86850 RBC ANTIBODY SCREEN: CPT

## 2019-11-11 PROCEDURE — 87081 CULTURE SCREEN ONLY: CPT

## 2019-11-14 ENCOUNTER — OFFICE VISIT (OUTPATIENT)
Dept: FAMILY MEDICINE CLINIC | Facility: CLINIC | Age: 69
End: 2019-11-14

## 2019-11-14 ENCOUNTER — LAB ENCOUNTER (OUTPATIENT)
Dept: LAB | Age: 69
End: 2019-11-14
Attending: FAMILY MEDICINE
Payer: COMMERCIAL

## 2019-11-14 VITALS
HEIGHT: 66 IN | OXYGEN SATURATION: 97 % | WEIGHT: 205 LBS | HEART RATE: 80 BPM | BODY MASS INDEX: 32.95 KG/M2 | SYSTOLIC BLOOD PRESSURE: 132 MMHG | TEMPERATURE: 98 F | DIASTOLIC BLOOD PRESSURE: 70 MMHG | RESPIRATION RATE: 16 BRPM

## 2019-11-14 DIAGNOSIS — E66.09 CLASS 1 OBESITY DUE TO EXCESS CALORIES WITH SERIOUS COMORBIDITY AND BODY MASS INDEX (BMI) OF 33.0 TO 33.9 IN ADULT: ICD-10-CM

## 2019-11-14 DIAGNOSIS — I10 ESSENTIAL HYPERTENSION, BENIGN: ICD-10-CM

## 2019-11-14 DIAGNOSIS — M16.11 PRIMARY OSTEOARTHRITIS OF RIGHT HIP: ICD-10-CM

## 2019-11-14 DIAGNOSIS — Z23 NEED FOR VACCINATION: ICD-10-CM

## 2019-11-14 DIAGNOSIS — E78.5 DYSLIPIDEMIA: ICD-10-CM

## 2019-11-14 DIAGNOSIS — Z01.818 PREOP EXAMINATION: Primary | ICD-10-CM

## 2019-11-14 DIAGNOSIS — Z01.818 PREOP EXAMINATION: ICD-10-CM

## 2019-11-14 PROCEDURE — 36415 COLL VENOUS BLD VENIPUNCTURE: CPT

## 2019-11-14 PROCEDURE — 93000 ELECTROCARDIOGRAM COMPLETE: CPT | Performed by: FAMILY MEDICINE

## 2019-11-14 PROCEDURE — 99214 OFFICE O/P EST MOD 30 MIN: CPT | Performed by: FAMILY MEDICINE

## 2019-11-14 PROCEDURE — 85025 COMPLETE CBC W/AUTO DIFF WBC: CPT

## 2019-11-14 PROCEDURE — 85610 PROTHROMBIN TIME: CPT

## 2019-11-14 PROCEDURE — 81001 URINALYSIS AUTO W/SCOPE: CPT

## 2019-11-14 PROCEDURE — 85730 THROMBOPLASTIN TIME PARTIAL: CPT

## 2019-11-14 PROCEDURE — 80053 COMPREHEN METABOLIC PANEL: CPT

## 2019-11-14 NOTE — PROGRESS NOTES
Vika Thurman is a 81year old female.   HPI:       This is a 46-year-old obese  female with hypertension and dyslipidemia who presents for H&P with medical clearance as requested by her surgeon Dr. Jeison Painter prior to right total hip arthroplas disease)     Essential hypertension, benign     Hip pain, bilateral     Prediabetes     Primary osteoarthritis of both knees     Essential hypertension     Dyslipidemia     Hypothyroidism     Status post total left knee replacement     Osteoarthritis of ri SURGERY     • KNEE TOTAL REPLACEMENT Left 12/1/2016    Performed by Shelbie Goodman MD at [facility] MAIN OR   • OOPHORECTOMY Left 1983    Left ovary removed.    • UPPER GI ENDOSCOPY,EXAM  5-16-14      Social History:  Social History    Tobacco Use      Smoking s and moist.   Eyes: Pupils are equal, round, and reactive to light. Conjunctivae and EOM are normal.   Neck: Neck supple. No thyromegaly present. Cardiovascular: Normal rate, regular rhythm and normal heart sounds. No murmur heard. Edema not present. 7 - 18 mg/dL  12   CREATININE      0.55 - 1.02 mg/dL  0.83   BUN/CREAT Ratio      10.0 - 20.0  14.5   CALCIUM      8.5 - 10.1 mg/dL  9.9   CALCULATED OSMOLALITY      275 - 295 mOsm/kg  291   eGFR NON-AFR.  AMERICAN      >=60  73   eGFR  female with hypertension and dyslipidemia who presents for H&P with medical clearance as requested by her surgeon Dr. Cayla Roth prior to right total hip arthroplasty for osteoarthritis of right hip scheduled for 11/20/2019 to be performed at [facility]

## 2019-11-15 ENCOUNTER — TELEPHONE (OUTPATIENT)
Dept: FAMILY MEDICINE CLINIC | Facility: CLINIC | Age: 69
End: 2019-11-15

## 2019-11-15 NOTE — TELEPHONE ENCOUNTER
Prem Haas with [facility] pre admission called wants the EKG faxed over to [phone].

## 2019-11-19 NOTE — H&P
[facility]  History & Physical    Genevie Nadine Patient Status:  Surgery Admit - Inpt    1950 MRN [id number]   [facility] SURGERY Attending Fernanda Tan MD   Hosp Day # 0 PCP Kevyn Hines DO     Date of Admission:  ( Family History   Problem Relation Age of Onset   • Hypertension Mother    • Hypertension Father    • Breast Cancer Maternal Aunt       reports that she quit smoking about 40 years ago. She has a 6.00 pack-year smoking history.  She has never used smokel right total hip arthroplasty        Kirstin Macias  11/19/2019  12:49 PM

## 2019-11-20 ENCOUNTER — APPOINTMENT (OUTPATIENT)
Dept: GENERAL RADIOLOGY | Facility: HOSPITAL | Age: 69
DRG: 470 | End: 2019-11-20
Attending: ORTHOPAEDIC SURGERY
Payer: COMMERCIAL

## 2019-11-20 ENCOUNTER — HOSPITAL ENCOUNTER (INPATIENT)
Facility: HOSPITAL | Age: 69
LOS: 2 days | Discharge: HOME HEALTH | DRG: 470 | End: 2019-11-22
Attending: ORTHOPAEDIC SURGERY | Admitting: ORTHOPAEDIC SURGERY
Payer: COMMERCIAL

## 2019-11-20 ENCOUNTER — ANESTHESIA (OUTPATIENT)
Dept: SURGERY | Facility: HOSPITAL | Age: 69
DRG: 470 | End: 2019-11-20
Payer: COMMERCIAL

## 2019-11-20 ENCOUNTER — ANESTHESIA EVENT (OUTPATIENT)
Dept: SURGERY | Facility: HOSPITAL | Age: 69
DRG: 470 | End: 2019-11-20
Payer: COMMERCIAL

## 2019-11-20 DIAGNOSIS — M16.11 PRIMARY OSTEOARTHRITIS OF RIGHT HIP: ICD-10-CM

## 2019-11-20 DIAGNOSIS — M16.11 OSTEOARTHRITIS OF RIGHT HIP: Primary | ICD-10-CM

## 2019-11-20 PROBLEM — M16.9 OSTEOARTHRITIS OF HIP: Status: ACTIVE | Noted: 2018-06-09

## 2019-11-20 PROCEDURE — 3E0T3BZ INTRODUCTION OF ANESTHETIC AGENT INTO PERIPHERAL NERVES AND PLEXI, PERCUTANEOUS APPROACH: ICD-10-PCS | Performed by: ANESTHESIOLOGY

## 2019-11-20 PROCEDURE — 99253 IP/OBS CNSLTJ NEW/EST LOW 45: CPT | Performed by: STUDENT IN AN ORGANIZED HEALTH CARE EDUCATION/TRAINING PROGRAM

## 2019-11-20 PROCEDURE — 73501 X-RAY EXAM HIP UNI 1 VIEW: CPT | Performed by: ORTHOPAEDIC SURGERY

## 2019-11-20 PROCEDURE — 0SR903A REPLACEMENT OF RIGHT HIP JOINT WITH CERAMIC SYNTHETIC SUBSTITUTE, UNCEMENTED, OPEN APPROACH: ICD-10-PCS | Performed by: ORTHOPAEDIC SURGERY

## 2019-11-20 DEVICE — APEX HOLE ELIMINATOR - PS
Type: IMPLANTABLE DEVICE | Site: HIP | Status: FUNCTIONAL
Brand: APEX

## 2019-11-20 DEVICE — PINNACLE POROCOAT ACETABULAR SHELL SECTOR II 50MM OD
Type: IMPLANTABLE DEVICE | Site: HIP | Status: FUNCTIONAL
Brand: PINNACLE POROCOAT

## 2019-11-20 DEVICE — PINNACLE HIP SOLUTIONS ALTRX POLYETHYLENE ACETABULAR LINER NEUTRAL 32MM ID 50MM OD
Type: IMPLANTABLE DEVICE | Site: HIP | Status: FUNCTIONAL
Brand: PINNACLE ALTRX

## 2019-11-20 DEVICE — BIOLOX DELTA CERAMIC FEMORAL HEAD 32MM DIA +5.0 12/14 TAPER
Type: IMPLANTABLE DEVICE | Site: HIP | Status: FUNCTIONAL
Brand: BIOLOX DELTA

## 2019-11-20 DEVICE — CORAIL HIP SYSTEM CEMENTLESS FEMORAL STEM 12/14 AMT 135 DEGREES KS SIZE 9 HA COATED STANDARD NO COLLAR
Type: IMPLANTABLE DEVICE | Site: HIP | Status: FUNCTIONAL
Brand: CORAIL

## 2019-11-20 DEVICE — PINNACLE CANCELLOUS BONE SCREW 6.5MM X 20MM
Type: IMPLANTABLE DEVICE | Site: HIP | Status: FUNCTIONAL
Brand: PINNACLE

## 2019-11-20 RX ORDER — OXYCODONE HYDROCHLORIDE 5 MG/1
5 TABLET ORAL EVERY 4 HOURS PRN
Status: DISCONTINUED | OUTPATIENT
Start: 2019-11-20 | End: 2019-11-21

## 2019-11-20 RX ORDER — PROCHLORPERAZINE EDISYLATE 5 MG/ML
10 INJECTION INTRAMUSCULAR; INTRAVENOUS EVERY 6 HOURS PRN
Status: DISCONTINUED | OUTPATIENT
Start: 2019-11-20 | End: 2019-11-22

## 2019-11-20 RX ORDER — NALOXONE HYDROCHLORIDE 0.4 MG/ML
80 INJECTION, SOLUTION INTRAMUSCULAR; INTRAVENOUS; SUBCUTANEOUS AS NEEDED
Status: DISCONTINUED | OUTPATIENT
Start: 2019-11-20 | End: 2019-11-20 | Stop reason: HOSPADM

## 2019-11-20 RX ORDER — SENNOSIDES 8.6 MG
17.2 TABLET ORAL NIGHTLY
Status: DISCONTINUED | OUTPATIENT
Start: 2019-11-20 | End: 2019-11-22

## 2019-11-20 RX ORDER — RANITIDINE 300 MG/1
300 CAPSULE ORAL DAILY
COMMUNITY
End: 2020-04-07

## 2019-11-20 RX ORDER — ACETAMINOPHEN 325 MG/1
650 TABLET ORAL EVERY 6 HOURS PRN
Status: DISCONTINUED | OUTPATIENT
Start: 2019-11-20 | End: 2019-11-20 | Stop reason: HOSPADM

## 2019-11-20 RX ORDER — SODIUM CHLORIDE, SODIUM LACTATE, POTASSIUM CHLORIDE, CALCIUM CHLORIDE 600; 310; 30; 20 MG/100ML; MG/100ML; MG/100ML; MG/100ML
INJECTION, SOLUTION INTRAVENOUS CONTINUOUS
Status: DISCONTINUED | OUTPATIENT
Start: 2019-11-20 | End: 2019-11-20 | Stop reason: HOSPADM

## 2019-11-20 RX ORDER — BUPIVACAINE HYDROCHLORIDE 5 MG/ML
INJECTION, SOLUTION EPIDURAL; INTRACAUDAL AS NEEDED
Status: DISCONTINUED | OUTPATIENT
Start: 2019-11-20 | End: 2019-11-20 | Stop reason: SURG

## 2019-11-20 RX ORDER — CEFAZOLIN SODIUM/WATER 2 G/20 ML
2 SYRINGE (ML) INTRAVENOUS ONCE
Status: COMPLETED | OUTPATIENT
Start: 2019-11-20 | End: 2019-11-20

## 2019-11-20 RX ORDER — TIZANIDINE 2 MG/1
2 TABLET ORAL 3 TIMES DAILY PRN
Status: DISCONTINUED | OUTPATIENT
Start: 2019-11-20 | End: 2019-11-22

## 2019-11-20 RX ORDER — ONDANSETRON 2 MG/ML
4 INJECTION INTRAMUSCULAR; INTRAVENOUS EVERY 4 HOURS PRN
Status: DISCONTINUED | OUTPATIENT
Start: 2019-11-20 | End: 2019-11-22

## 2019-11-20 RX ORDER — HYDROCODONE BITARTRATE AND ACETAMINOPHEN 10; 325 MG/1; MG/1
1-2 TABLET ORAL EVERY 4 HOURS PRN
Qty: 60 TABLET | Refills: 0 | Status: SHIPPED | OUTPATIENT
Start: 2019-11-20 | End: 2020-06-25

## 2019-11-20 RX ORDER — MEPERIDINE HYDROCHLORIDE 25 MG/ML
12.5 INJECTION INTRAMUSCULAR; INTRAVENOUS; SUBCUTANEOUS AS NEEDED
Status: DISCONTINUED | OUTPATIENT
Start: 2019-11-20 | End: 2019-11-20 | Stop reason: HOSPADM

## 2019-11-20 RX ORDER — METOCLOPRAMIDE HYDROCHLORIDE 5 MG/ML
10 INJECTION INTRAMUSCULAR; INTRAVENOUS AS NEEDED
Status: DISCONTINUED | OUTPATIENT
Start: 2019-11-20 | End: 2019-11-20 | Stop reason: HOSPADM

## 2019-11-20 RX ORDER — ACETAMINOPHEN 500 MG
1000 TABLET ORAL ONCE
Status: DISCONTINUED | OUTPATIENT
Start: 2019-11-20 | End: 2019-11-20

## 2019-11-20 RX ORDER — HYDROMORPHONE HYDROCHLORIDE 1 MG/ML
0.4 INJECTION, SOLUTION INTRAMUSCULAR; INTRAVENOUS; SUBCUTANEOUS EVERY 5 MIN PRN
Status: DISCONTINUED | OUTPATIENT
Start: 2019-11-20 | End: 2019-11-20 | Stop reason: HOSPADM

## 2019-11-20 RX ORDER — METOCLOPRAMIDE HYDROCHLORIDE 5 MG/ML
10 INJECTION INTRAMUSCULAR; INTRAVENOUS EVERY 6 HOURS PRN
Status: DISCONTINUED | OUTPATIENT
Start: 2019-11-20 | End: 2019-11-22

## 2019-11-20 RX ORDER — CEFAZOLIN SODIUM/WATER 2 G/20 ML
2 SYRINGE (ML) INTRAVENOUS EVERY 8 HOURS
Status: COMPLETED | OUTPATIENT
Start: 2019-11-20 | End: 2019-11-21

## 2019-11-20 RX ORDER — KETOROLAC TROMETHAMINE 15 MG/ML
15 INJECTION, SOLUTION INTRAMUSCULAR; INTRAVENOUS EVERY 6 HOURS
Status: COMPLETED | OUTPATIENT
Start: 2019-11-20 | End: 2019-11-21

## 2019-11-20 RX ORDER — DIPHENHYDRAMINE HYDROCHLORIDE 50 MG/ML
25 INJECTION INTRAMUSCULAR; INTRAVENOUS ONCE AS NEEDED
Status: ACTIVE | OUTPATIENT
Start: 2019-11-20 | End: 2019-11-20

## 2019-11-20 RX ORDER — LEVOTHYROXINE SODIUM 0.07 MG/1
75 TABLET ORAL
Status: DISCONTINUED | OUTPATIENT
Start: 2019-11-21 | End: 2019-11-22

## 2019-11-20 RX ORDER — HYDROCODONE BITARTRATE AND ACETAMINOPHEN 10; 325 MG/1; MG/1
1 TABLET ORAL AS NEEDED
Status: DISCONTINUED | OUTPATIENT
Start: 2019-11-20 | End: 2019-11-20 | Stop reason: HOSPADM

## 2019-11-20 RX ORDER — PHENYLEPHRINE HCL 10 MG/ML
VIAL (ML) INJECTION AS NEEDED
Status: DISCONTINUED | OUTPATIENT
Start: 2019-11-20 | End: 2019-11-20 | Stop reason: SURG

## 2019-11-20 RX ORDER — SODIUM PHOSPHATE, DIBASIC AND SODIUM PHOSPHATE, MONOBASIC 7; 19 G/133ML; G/133ML
1 ENEMA RECTAL ONCE AS NEEDED
Status: DISCONTINUED | OUTPATIENT
Start: 2019-11-20 | End: 2019-11-22

## 2019-11-20 RX ORDER — OXYCODONE HYDROCHLORIDE 10 MG/1
10 TABLET ORAL EVERY 4 HOURS PRN
Status: DISCONTINUED | OUTPATIENT
Start: 2019-11-20 | End: 2019-11-21

## 2019-11-20 RX ORDER — ACETAMINOPHEN 325 MG/1
TABLET ORAL
Status: COMPLETED
Start: 2019-11-20 | End: 2019-11-20

## 2019-11-20 RX ORDER — ACETAMINOPHEN 325 MG/1
650 TABLET ORAL 4 TIMES DAILY
Status: DISCONTINUED | OUTPATIENT
Start: 2019-11-20 | End: 2019-11-21

## 2019-11-20 RX ORDER — HYDROMORPHONE HYDROCHLORIDE 1 MG/ML
0.2 INJECTION, SOLUTION INTRAMUSCULAR; INTRAVENOUS; SUBCUTANEOUS EVERY 2 HOUR PRN
Status: DISCONTINUED | OUTPATIENT
Start: 2019-11-20 | End: 2019-11-22

## 2019-11-20 RX ORDER — DIPHENHYDRAMINE HCL 25 MG
25 CAPSULE ORAL EVERY 4 HOURS PRN
Status: DISCONTINUED | OUTPATIENT
Start: 2019-11-20 | End: 2019-11-22

## 2019-11-20 RX ORDER — BISACODYL 10 MG
10 SUPPOSITORY, RECTAL RECTAL
Status: DISCONTINUED | OUTPATIENT
Start: 2019-11-20 | End: 2019-11-22

## 2019-11-20 RX ORDER — ZOLPIDEM TARTRATE 5 MG/1
5 TABLET ORAL NIGHTLY PRN
Status: DISCONTINUED | OUTPATIENT
Start: 2019-11-20 | End: 2019-11-22

## 2019-11-20 RX ORDER — BIOTIN 1 MG
1000 TABLET ORAL DAILY
Status: DISCONTINUED | OUTPATIENT
Start: 2019-11-20 | End: 2019-11-20 | Stop reason: RX

## 2019-11-20 RX ORDER — HYDROCODONE BITARTRATE AND ACETAMINOPHEN 10; 325 MG/1; MG/1
2 TABLET ORAL AS NEEDED
Status: DISCONTINUED | OUTPATIENT
Start: 2019-11-20 | End: 2019-11-20 | Stop reason: HOSPADM

## 2019-11-20 RX ORDER — PANTOPRAZOLE SODIUM 20 MG/1
20 TABLET, DELAYED RELEASE ORAL
Status: DISCONTINUED | OUTPATIENT
Start: 2019-11-21 | End: 2019-11-22

## 2019-11-20 RX ORDER — HYDROMORPHONE HYDROCHLORIDE 1 MG/ML
0.8 INJECTION, SOLUTION INTRAMUSCULAR; INTRAVENOUS; SUBCUTANEOUS EVERY 2 HOUR PRN
Status: DISCONTINUED | OUTPATIENT
Start: 2019-11-20 | End: 2019-11-22

## 2019-11-20 RX ORDER — BUPIVACAINE HYDROCHLORIDE 7.5 MG/ML
INJECTION, SOLUTION INTRASPINAL AS NEEDED
Status: DISCONTINUED | OUTPATIENT
Start: 2019-11-20 | End: 2019-11-20 | Stop reason: SURG

## 2019-11-20 RX ORDER — OXYCODONE HYDROCHLORIDE 15 MG/1
15 TABLET ORAL EVERY 4 HOURS PRN
Status: DISCONTINUED | OUTPATIENT
Start: 2019-11-20 | End: 2019-11-21

## 2019-11-20 RX ORDER — HYDROMORPHONE HYDROCHLORIDE 1 MG/ML
0.4 INJECTION, SOLUTION INTRAMUSCULAR; INTRAVENOUS; SUBCUTANEOUS EVERY 2 HOUR PRN
Status: DISCONTINUED | OUTPATIENT
Start: 2019-11-20 | End: 2019-11-22

## 2019-11-20 RX ORDER — MIDAZOLAM HYDROCHLORIDE 1 MG/ML
INJECTION INTRAMUSCULAR; INTRAVENOUS AS NEEDED
Status: DISCONTINUED | OUTPATIENT
Start: 2019-11-20 | End: 2019-11-20 | Stop reason: SURG

## 2019-11-20 RX ORDER — OMEPRAZOLE 20 MG/1
20 CAPSULE, DELAYED RELEASE ORAL EVERY OTHER DAY
COMMUNITY
End: 2020-04-07

## 2019-11-20 RX ORDER — SODIUM CHLORIDE, SODIUM LACTATE, POTASSIUM CHLORIDE, CALCIUM CHLORIDE 600; 310; 30; 20 MG/100ML; MG/100ML; MG/100ML; MG/100ML
INJECTION, SOLUTION INTRAVENOUS CONTINUOUS
Status: DISCONTINUED | OUTPATIENT
Start: 2019-11-20 | End: 2019-11-22

## 2019-11-20 RX ORDER — ONDANSETRON 2 MG/ML
4 INJECTION INTRAMUSCULAR; INTRAVENOUS AS NEEDED
Status: DISCONTINUED | OUTPATIENT
Start: 2019-11-20 | End: 2019-11-20 | Stop reason: HOSPADM

## 2019-11-20 RX ORDER — POLYETHYLENE GLYCOL 3350 17 G/17G
17 POWDER, FOR SOLUTION ORAL DAILY PRN
Status: DISCONTINUED | OUTPATIENT
Start: 2019-11-20 | End: 2019-11-22

## 2019-11-20 RX ORDER — DIPHENHYDRAMINE HYDROCHLORIDE 50 MG/ML
12.5 INJECTION INTRAMUSCULAR; INTRAVENOUS EVERY 4 HOURS PRN
Status: DISCONTINUED | OUTPATIENT
Start: 2019-11-20 | End: 2019-11-22

## 2019-11-20 RX ADMIN — BUPIVACAINE HYDROCHLORIDE 2 ML: 7.5 INJECTION, SOLUTION INTRASPINAL at 12:07:00

## 2019-11-20 RX ADMIN — SODIUM CHLORIDE, SODIUM LACTATE, POTASSIUM CHLORIDE, CALCIUM CHLORIDE: 600; 310; 30; 20 INJECTION, SOLUTION INTRAVENOUS at 13:32:00

## 2019-11-20 RX ADMIN — PHENYLEPHRINE HCL 100 MCG: 10 MG/ML VIAL (ML) INJECTION at 12:51:00

## 2019-11-20 RX ADMIN — SODIUM CHLORIDE, SODIUM LACTATE, POTASSIUM CHLORIDE, CALCIUM CHLORIDE: 600; 310; 30; 20 INJECTION, SOLUTION INTRAVENOUS at 13:12:00

## 2019-11-20 RX ADMIN — PHENYLEPHRINE HCL 100 MCG: 10 MG/ML VIAL (ML) INJECTION at 12:27:00

## 2019-11-20 RX ADMIN — PHENYLEPHRINE HCL 100 MCG: 10 MG/ML VIAL (ML) INJECTION at 12:32:00

## 2019-11-20 RX ADMIN — PHENYLEPHRINE HCL 100 MCG: 10 MG/ML VIAL (ML) INJECTION at 13:01:00

## 2019-11-20 RX ADMIN — BUPIVACAINE HYDROCHLORIDE 30 ML: 5 INJECTION, SOLUTION EPIDURAL; INTRACAUDAL at 12:09:00

## 2019-11-20 RX ADMIN — CEFAZOLIN SODIUM/WATER 2 G: 2 G/20 ML SYRINGE (ML) INTRAVENOUS at 12:25:00

## 2019-11-20 RX ADMIN — MIDAZOLAM HYDROCHLORIDE 4 MG: 1 INJECTION INTRAMUSCULAR; INTRAVENOUS at 12:06:00

## 2019-11-20 NOTE — INTERVAL H&P NOTE
Pre-op Diagnosis: Primary osteoarthritis of right hip [M16.11]    The above referenced H&P was reviewed by Angelica Heath MD on 11/20/2019, the patient was examined and no significant changes have occurred in the patient's condition since the H&P was per

## 2019-11-20 NOTE — ANESTHESIA PROCEDURE NOTES
Regional Block  Performed by: Jerry Lilly MD  Authorized by: Jerry Lilly MD       General Information and Staff    Start Time:  11/20/2019 12:11 PM  End Time:  11/20/2019 12:13 PM  Anesthesiologist:  Jerry Lilly MD  Performed by:   Marjan

## 2019-11-20 NOTE — CONSULTS
[facility]  CONSULT     Paco Ramos Patient Status:  Inpatient    1950 MRN [id number]   [facility] 3SW-A Attending Braulio Hoang MD   Hosp Day # 0 PCP Berenice Pallas, DO     Reason for consult: Medical Mgt     Req smoking about 40 years ago. She has a 6.00 pack-year smoking history. She has never used smokeless tobacco. She reports that she does not drink alcohol or use drugs.     Family History:   Family History   Problem Relation Age of Onset   • Hypertension Jesse bilaterally. No wheezes. No rhonchi. Cardiovascular: S1, S2. Regular rate and rhythm. No murmurs, rubs or gallops. Equal pulses. Abdomen: Soft, nontender, nondistended. Positive bowel sounds. No rebound, guarding or organomegaly.   Neurologic: No focal

## 2019-11-20 NOTE — ANESTHESIA PROCEDURE NOTES
Spinal Block  Performed by: Angeles Knott MD  Authorized by: Angeles Knott MD       General Information and Staff    Start Time:  11/20/2019 12:07 PM  End Time:  11/20/2019 12:10 PM  Anesthesiologist:  Angeles Knott MD  Performed by:   Anesth

## 2019-11-20 NOTE — ANESTHESIA POSTPROCEDURE EVALUATION
[facility]    Stephanie Carbone Patient Status:  Surgery Admit - Inpt   Age/Gender 81year old female MRN [id number]   [facility] SURGERY Attending Oral Serrano, [address] [facility] Se Day # 0 PCP Chun Aguilar DO       Anesthesia Post-op Note

## 2019-11-20 NOTE — OPERATIVE REPORT
DATE OF PROCEDURE:  11/20/2019  PREOPERATIVE DIAGNOSIS: Severe osteoarthritis right hip. POSTOPERATIVE DIAGNOSIS: Severe osteoarthritis right hip. PROCEDURE PERFORMED: Non-cemented right total hip arthroplasty. SURGEON:  EH Alonzo AS fixation. An apical hole eliminator was used. . A neutral liner to fit the acetabulum and to accept a 32 mm femoral head was impacted into place. Attention was turned to the femur. A cylinder osteotome was used to clean out the greater trochanter.  A starti

## 2019-11-20 NOTE — ANESTHESIA PREPROCEDURE EVALUATION
PRE-OP EVALUATION    Patient Name: Dontae Labrum    Pre-op Diagnosis: Primary osteoarthritis of right hip [M16.11]    Procedure(s):  RIGHT TOTAL HIP ARTHROPLASTY     Surgeon(s) and Role:     Alena Flores MD - Primary    Pre-op vitals reviewed. Complications           GI/Hepatic/Renal      (+) GERD                           Cardiovascular                  (+) hypertension                                     Endo/Other                                  Pulmonary                           Neuro/Psyc verified and patient meets guidelines. Post-procedure pain management plan discussed with surgeon and patient.   Surgeon requests: regional block    Plan/risks discussed with: patient                Present on Admission:  **None**

## 2019-11-21 PROBLEM — M16.11 OSTEOARTHRITIS OF RIGHT HIP: Status: ACTIVE | Noted: 2018-06-09

## 2019-11-21 PROCEDURE — 99232 SBSQ HOSP IP/OBS MODERATE 35: CPT | Performed by: STUDENT IN AN ORGANIZED HEALTH CARE EDUCATION/TRAINING PROGRAM

## 2019-11-21 RX ORDER — HYDROCODONE BITARTRATE AND ACETAMINOPHEN 10; 325 MG/1; MG/1
1 TABLET ORAL EVERY 4 HOURS PRN
Status: DISCONTINUED | OUTPATIENT
Start: 2019-11-21 | End: 2019-11-22

## 2019-11-21 RX ORDER — POTASSIUM CHLORIDE 1.5 G/1.77G
40 POWDER, FOR SOLUTION ORAL ONCE
Status: DISCONTINUED | OUTPATIENT
Start: 2019-11-21 | End: 2019-11-22

## 2019-11-21 RX ORDER — HYDROCODONE BITARTRATE AND ACETAMINOPHEN 10; 325 MG/1; MG/1
2 TABLET ORAL EVERY 4 HOURS PRN
Status: DISCONTINUED | OUTPATIENT
Start: 2019-11-21 | End: 2019-11-22

## 2019-11-21 NOTE — CM/SW NOTE
82 yo sp total hip replacement. Post op protocol orders for discharge planning. No preop joint journey plan from [facility] ortho. Pt [facility] HX [facility] using [facility].      HOME SITUATION  Type of Home: House(mobile home)   Home Layout: One level  [facility] Out of season

## 2019-11-21 NOTE — HOME CARE LIAISON
MET WITH PTNT AND OFFERED CHOICE  OF AGENCIES. PTNT AGREEABLE TO [facility]. MET WITH PTNT TO DISCUSS HOME HEALTH SERVICES AND COVERAGE CRITERIA. PTNT AGREEABLE TO [facility]. PTNT GIVEN RESIDENTIAL BROCHURE.  RESIDENTIAL WITH PROVIDE SN/PT ON DISC

## 2019-11-21 NOTE — PHYSICAL THERAPY NOTE
PHYSICAL THERAPY HIP TREATMENT NOTE - INPATIENT      Room Number: 375/375-A     Session: 1&2  Number of Visits to Meet Established Goals: 3    Presenting Problem: s/p right TARUN 11/20/19    Problem List  Active Problems:    Essential hypertension    Hypothy RESTRICTION  Weight Bearing Restriction: R lower extremity        R Lower Extremity: Weight Bearing as Tolerated       PAIN ASSESSMENT   Rating: 3  Location: right hip  Management Techniques:  Activity promotion    BALANCE  Static Sitting: Good  Dynamic Sit strike with allowance for right knee flexion during swing phase of gait. Tolerance for gait training was good. PM:  Pt arrived to PT gym via bedside chair, educated in goals for session.   Pt demonstrating improvement in quality and tolerated increase PT    PLAN  PT Treatment Plan: Energy conservation;Patient education;Gait training;Range of motion;Strengthening;Stair training  Rehab Potential : Good  Frequency (Obs): BID    CURRENT GOALS  Goal #1  Patient is able to demonstrate supine - sit EOB @ level

## 2019-11-21 NOTE — PROGRESS NOTES
[facility]  Progress Note     Alveta Salt Patient Status:  Inpatient    1950 MRN [id number]   [facility] 3SW-A Attending Alcira Onofre MD   Hosp Day # 1 PCP Rashad Roach DO     Chief Complaint: Teressa Moons.  Management Once   • Senna  17.2 mg Oral Nightly   • apixaban  2.5 mg Oral BID   • acetaminophen  650 mg Oral QID   • Levothyroxine Sodium  75 mcg Oral Before breakfast   • Pantoprazole Sodium  20 mg Oral QAM AC       ASSESSMENT / PLAN:     1. Right OA, s/p TARUN  1.  Pa

## 2019-11-21 NOTE — PHYSICAL THERAPY NOTE
PHYSICAL THERAPY HIP EVALUATION - INPATIENT     Room Number: 375/375-A  Evaluation Date: 11/21/2019  Type of Evaluation: Initial  Physician Order: PT Eval and Treat    Presenting Problem: s/p right TARUN 11/20/19  Reason for Therapy: Mobility Dysfunction and ENDOSCOPY,EXAM  5-16-14       HOME SITUATION  Type of Home: House(mobile home)   Home Layout: One level  Stairs to Enter : 4     Stairs to [employer]: 0       Lives With: Spouse(89yo father)  Drives: Yes  Patient Owned Equipment: Rolling walker;Cane  Patient need...   -   Moving to and from a bed to a chair (including a wheelchair)?: A Little   -   Need to walk in hospital room?: A Little   -   Climbing 3-5 steps with a railing?: A Little       AM-PAC Score:  Raw Score: 18   Approx Degree of Impairment: 46.58% following impairments right LE pain, dec right hip ROM and strength, dec activity tolerance. The AM-PAC '6-Clicks' Inpatient Basic Mobility Short Form was completed and this patient is demonstrating a 46.58% degree of impairment in mobility.  Research supp

## 2019-11-21 NOTE — OCCUPATIONAL THERAPY NOTE
OCCUPATIONAL THERAPY QUICK EVALUATION - INPATIENT    Room Number: 375/375-A  Evaluation Date: 11/21/2019     Type of Evaluation: Quick Eval  Presenting Problem: s/p R TARUN 11/20/19    Physician Order: IP Consult to Occupational Therapy  Reason for Therapy: Thomas Simons MD at [facility] MAIN OR   • OOPHORECTOMY Left 1983    Left ovary removed.    • UPPER GI ENDOSCOPY,EXAM  5-16-14       OCCUPATIONAL PROFILE    HOME SITUATION  Type of Home: House(mobile home)  Home Layout: One level  Lives With: Spouse(87yo father None    AM-PAC Score:  Score: 21  Approx Degree of Impairment: 32.79%  Standardized Score (AM-PAC Scale): 44.27  CMS Modifier (G-Code): CJ    FUNCTIONAL TRANSFER ASSESSMENT  Supine to Sit : Modified independent  Sit to Stand: Supervision    Skilled Therapy comorbidities nor modifications of tasks    Clinical Decision Making  LOW - Analysis of occupational profile, problem-focused assessments, limited treatment options    Overall Complexity  LOW     OT Discharge Recommendations: Home(with family assist)  OT D

## 2019-11-21 NOTE — PLAN OF CARE
Pod#0, A/Ox4 , IS encouraged, aquacel dressing C/D/I, no numbness or tingling, eliquis, RA, DTV @10pm, abductor pillow, pain medication administered, WBAT, ancef administered, Norco and eliquis on chart,fall precautions in place, call light within reach

## 2019-11-21 NOTE — PLAN OF CARE
The nerve block started to pass and patient did get the sensations back. She reported a mild pain to her hip side. Attempted to sit patient up but became nauseous and lightheaded. Zofran was given with good relief.  VS are stable, still DTV by 9pm. Abductor

## 2019-11-21 NOTE — PROGRESS NOTES
[facility]    Brittney Khan Patient Status:  Inpatient    1950 MRN [id number]   [facility] 3SW-A Attending Jossue Edward MD   [facility] Day # 1 PCP Nick George DO     Subjective:  RightTotal Hip Arthroplasty  Systemic or

## 2019-11-21 NOTE — PLAN OF CARE
Pt having minimal pain, declines pain meds at this time. Had nausea this am from Oxy IR. Still drowsiness currently. VSS. Ambulating well with walker and assist. Voiding without difficulty. Plan: home with HHPT when ready. Will monitor.

## 2019-11-22 VITALS
TEMPERATURE: 99 F | RESPIRATION RATE: 20 BRPM | WEIGHT: 203.38 LBS | HEART RATE: 93 BPM | OXYGEN SATURATION: 98 % | DIASTOLIC BLOOD PRESSURE: 58 MMHG | SYSTOLIC BLOOD PRESSURE: 130 MMHG | HEIGHT: 66 IN | BODY MASS INDEX: 32.68 KG/M2

## 2019-11-22 PROCEDURE — 99233 SBSQ HOSP IP/OBS HIGH 50: CPT | Performed by: STUDENT IN AN ORGANIZED HEALTH CARE EDUCATION/TRAINING PROGRAM

## 2019-11-22 RX ORDER — POLYETHYLENE GLYCOL 3350 17 G/17G
17 POWDER, FOR SOLUTION ORAL DAILY PRN
Qty: 10 EACH | Refills: 0 | Status: SHIPPED | COMMUNITY
Start: 2019-11-22 | End: 2020-07-30 | Stop reason: ALTCHOICE

## 2019-11-22 RX ORDER — SORBITOL SOLUTION 70 %
30 SOLUTION, ORAL MISCELLANEOUS ONCE AS NEEDED
Status: DISCONTINUED | OUTPATIENT
Start: 2019-11-22 | End: 2019-11-22

## 2019-11-22 RX ORDER — DOCUSATE SODIUM 100 MG/1
CAPSULE, LIQUID FILLED ORAL
Qty: 60 CAPSULE | Refills: 0 | Status: SHIPPED | OUTPATIENT
Start: 2019-11-22 | End: 2020-06-25

## 2019-11-22 RX ORDER — SENNOSIDES 8.6 MG
17.2 TABLET ORAL 2 TIMES DAILY
Status: DISCONTINUED | OUTPATIENT
Start: 2019-11-22 | End: 2019-11-22

## 2019-11-22 NOTE — PROGRESS NOTES
Post Op Day 2 Ortho Note: Right TARUN    Assessed patient in chair. Rates pain 3-4/10 at rest and 5-6/10 with activity. States pain is managed with medications; denies itching/nausea/dizziness. Able to bear weight on sx leg; equal sensation in chair.

## 2019-11-22 NOTE — PROGRESS NOTES
[facility]    Racquel Lopez Patient Status:  Inpatient    1950 MRN [id number]   [facility] 3SW-A Attending Shelbie Goodman MD   [address] Day # 2 PCP Aldair Galo DO     Subjective:  RightTotal Hip Arthroplasty  Systemic or

## 2019-11-22 NOTE — PHYSICAL THERAPY NOTE
PHYSICAL THERAPY HIP TREATMENT NOTE - INPATIENT      Room Number: 375/375-A     Session: 3  Number of Visits to Meet Established Goals: 3    Presenting Problem: s/p right TARUN 11/20/19    Problem List  Active Problems:    Essential hypertension    Hypothyro >90*)    WEIGHT BEARING RESTRICTION  Weight Bearing Restriction: R lower extremity        R Lower Extremity: Weight Bearing as Tolerated       PAIN ASSESSMENT   Ratin  Location: R Hip  Management Techniques:  Activity promotion;Breathing techniques    B back to room by rehab aide.          Exercises AM Session    Ankle Pumps     20 reps    Quad Sets 20 reps    Glut Sets 20 reps    Hip Abd/Add 20 reps    Heel slides 20 reps    Saq 20 reps    Sitting Knee Extension 20 reps    Standing heel/toe raises 20 reps Adequate for d/c

## 2019-11-22 NOTE — PROGRESS NOTES
[facility]  Progress Note     Stephanie Carbone Patient Status:  Inpatient    1950 MRN [id number]   [facility] 3SW-A Attending Oral Serrano MD   [facility] Day # 2 PCP Chun Aguilar DO     Chief Complaint: Demetrice Leija.  Management / PLAN:     1. Right OA, s/p TARUN  1. Pain control  2. IS  3. Bowel care  4. PT/Ot  5. Per ortho  2. Hypertension  3. HLD  4. Hypothyroid    Plan of care:    Adjust bowel regimen  Cleared for d/c home     Quality:  · DVT Prophylaxis: apixaban  · CODE status:

## 2019-11-22 NOTE — CM/SW NOTE
11/22/19 1548   Discharge disposition   Expected discharge disposition Home-Health   Name of Facillity/Home Care/Hospice Residential   Discharge transportation Private car

## 2019-11-22 NOTE — PLAN OF CARE
POD#1, A/Ox4, VSS, , IS encouraged and deep breathing, SCD's, eliquis, Aquacel dressing C/D/I, abductor pillow in place, ice to right hip, ambulating to bathroom, voiding without difficulty, foot exercises encouraged, pain medication administered, D/C p

## 2019-11-23 NOTE — PLAN OF CARE
Pt A&O. On room air. Incentive spirometer and ankle pumps encouraged. Incision to right hip with Aquacel drsg C/D/I. SCDs to BLE. Pt tolerating regular diet with good appetite. Last BM 11/19/19. Senna given this AM. Pt voiding without difficulty.  Pain cont

## 2019-11-25 NOTE — DISCHARGE SUMMARY
[facility]  Discharge Summary    Ric Ochoa Patient Status:  Inpatient    1950 MRN [id number]   [facility] 3SW-A Attending No att. providers found   Hosp Day # 2 PCP Guera Rose DO     Date of Admission: 2019 Bobo Winters medication. Recommend that you take daily until you have a bowel movement. Then take daily as needed for constipation.         CONTINUE taking these medications    BIOTIN OR     Levothyroxine Sodium 75 MCG Tabs  Take 1 tablet (75 mcg total) by mouth before

## 2019-12-06 ENCOUNTER — TELEPHONE (OUTPATIENT)
Dept: FAMILY MEDICINE CLINIC | Facility: CLINIC | Age: 69
End: 2019-12-06

## 2019-12-06 DIAGNOSIS — Z96.649 HISTORY OF HIP REPLACEMENT, UNSPECIFIED LATERALITY: Primary | ICD-10-CM

## 2019-12-06 NOTE — TELEPHONE ENCOUNTER
Spoke to [facility]. The surgeon can order physical therapy but the patient has to go to locations where PCP is contracted with. She will have to go to an [facility] location for PT. She has printed order from her surgeon. Patient is aware.

## 2019-12-17 ENCOUNTER — OFFICE VISIT (OUTPATIENT)
Dept: PHYSICAL THERAPY | Age: 69
End: 2019-12-17
Attending: ORTHOPAEDIC SURGERY
Payer: COMMERCIAL

## 2019-12-17 DIAGNOSIS — M16.11 PRIMARY OSTEOARTHRITIS OF RIGHT HIP: ICD-10-CM

## 2019-12-17 DIAGNOSIS — Z96.641 STATUS POST TOTAL REPLACEMENT OF RIGHT HIP: ICD-10-CM

## 2019-12-17 PROCEDURE — 97110 THERAPEUTIC EXERCISES: CPT

## 2019-12-17 PROCEDURE — 97161 PT EVAL LOW COMPLEX 20 MIN: CPT

## 2019-12-17 PROCEDURE — 97116 GAIT TRAINING THERAPY: CPT

## 2019-12-17 NOTE — PROGRESS NOTES
POST-OP HIP EVALUATION:   Referring Physician: Dr. Cayla Roth  Diagnosis:   L TARUN Gait Dysfunction   Date of Service: 12/17/2019     PATIENT SUMMARY   Uma Dominique is a 81year old female who presents to therapy today s/p L TARUN with anterolateral appr evaluation findings, pathology, POC and HEP. Pt voiced understanding and performs HEP correctly without reported pain. Skilled Physical Therapy is medically necessary to address the above impairments and reach functional goals.      Precautions:  Hip flexi strength to 3+/5 or > to increase ease with standing and walking   · Pt will demonstrate improved SLS to > 5 seconds each LE to promote safety and decrease risk of falls on uneven surfaces such as grass  · Pt will perform TUG in < 10 seconds, demonstrating

## 2019-12-18 ENCOUNTER — PATIENT OUTREACH (OUTPATIENT)
Dept: CASE MANAGEMENT | Age: 69
End: 2019-12-18

## 2019-12-18 DIAGNOSIS — M16.11 PRIMARY OSTEOARTHRITIS OF RIGHT HIP: ICD-10-CM

## 2019-12-18 PROCEDURE — 1111F DSCHRG MED/CURRENT MED MERGE: CPT

## 2019-12-18 NOTE — PROGRESS NOTES
Initial Post Discharge Follow Up   Discharge Date: 11/22/19  Contact Date: 12/18/2019    Consent Verification:  Assessment Completed With: Patient  HIPAA Verified?   Yes    Discharge Dx:   Primary osteoarthritis of right hip, s/p Right THR by Dr. Jeison Painter satisfied with the discharge process?   yes    GENERAL:   Were you able to participate in any of the following educational opportunities:   Pre op class in person yes   Pre op class online no   Discharge class in person yes   Discharge video  no     Needs p 07, 2020  1:20 PM CST POSTOP with Sharif Sosa MD [address] ([facility] at [facility])        Jan 07, 2020  3:30 PM CST PT VISIT BY THERAPIST with [facility] in [address] reception staff. They will take your name and direct you to our P.T. clinic within the building. For security purposes, please check in with the reception staff at every visit.        Jan 16, 2020 11:30 AM CST PT VISIT BY THERAPIST with Lourdes Prajapati, please check in with the  reception staff. They will take your name and direct you to our P.T. clinic within the building. For security purposes, please check in with the reception staff at every visit.            [address]

## 2020-01-02 ENCOUNTER — OFFICE VISIT (OUTPATIENT)
Dept: PHYSICAL THERAPY | Age: 70
End: 2020-01-02
Attending: ORTHOPAEDIC SURGERY
Payer: COMMERCIAL

## 2020-01-02 PROCEDURE — 97110 THERAPEUTIC EXERCISES: CPT

## 2020-01-02 PROCEDURE — 97112 NEUROMUSCULAR REEDUCATION: CPT

## 2020-01-02 NOTE — PROGRESS NOTES
Diagnosis: R TARUN  11/20/2019    Precautions: anterior/lateral approach  Insurance TypeMedicare      Subjective: Pt reports little to no hip pain with any current activity. Continues to use SPC to ambulate.   She reports attending therapy will be difficult plans on returning to work in the near future but appears not capable of tolerating job demands at this time.      MMT:R hip globally 3-/5 to 3+/5  -ABD and EXT with slight improvement    Goals: (To be met in 10 visits)   · Pt will have improved hip AROM Fl

## 2020-01-07 ENCOUNTER — OFFICE VISIT (OUTPATIENT)
Dept: PHYSICAL THERAPY | Age: 70
End: 2020-01-07
Attending: ORTHOPAEDIC SURGERY
Payer: COMMERCIAL

## 2020-01-07 PROCEDURE — 97110 THERAPEUTIC EXERCISES: CPT

## 2020-01-07 PROCEDURE — 97112 NEUROMUSCULAR REEDUCATION: CPT

## 2020-01-07 NOTE — PROGRESS NOTES
Diagnosis: R TARUN  11/20/2019    Precautions: anterior/lateral approach  Insurance TypeMedicare      Subjective: Had f/u with surgeon. Will cont PT to improve strength and mobility, prepare to return to work in ~ 4 weeks.   Has practiced her HEP several sangita risk of falls on uneven surfaces such as grass  · Pt will perform TUG   in < 10 seconds, demonstrating improved gait speed for improved participation in ADL such as community ambulation  · Pt will be able to squat to  light objects around the house

## 2020-01-09 ENCOUNTER — OFFICE VISIT (OUTPATIENT)
Dept: PHYSICAL THERAPY | Age: 70
End: 2020-01-09
Attending: ORTHOPAEDIC SURGERY
Payer: COMMERCIAL

## 2020-01-09 PROCEDURE — 97110 THERAPEUTIC EXERCISES: CPT

## 2020-01-09 PROCEDURE — 97112 NEUROMUSCULAR REEDUCATION: CPT

## 2020-01-09 NOTE — PROGRESS NOTES
Diagnosis: R TARUN  11/20/2019    Precautions: anterior/lateral approach  Insurance TypeMedicare      Subjective: Performing HEP 1-2 times a day. No concerns.      Objective:    Treatment Number: 4 of 10    Ther ex:  30min    Supine: R hip PROM with hip 90/9 to promote safety and decrease risk of falls on uneven surfaces such as grass  · Pt will perform TUG   in < 10 seconds, demonstrating improved gait speed for improved participation in ADL such as community ambulation  · Pt will be able to squat to

## 2020-01-13 ENCOUNTER — APPOINTMENT (OUTPATIENT)
Dept: PHYSICAL THERAPY | Age: 70
End: 2020-01-13
Payer: COMMERCIAL

## 2020-01-14 ENCOUNTER — OFFICE VISIT (OUTPATIENT)
Dept: PHYSICAL THERAPY | Age: 70
End: 2020-01-14
Attending: ORTHOPAEDIC SURGERY
Payer: COMMERCIAL

## 2020-01-14 PROCEDURE — 97110 THERAPEUTIC EXERCISES: CPT

## 2020-01-14 NOTE — PROGRESS NOTES
Diagnosis: R TARUN  11/20/2019    Precautions: anterior/lateral approach  Insurance Type Medicare  IHP    8 there ex approved for 2020,     Subjective: Performing HEP 1-2 times a day. Has concerns about being ready to return to work by Feb 11th.   Job entail independently with minimal difficulty. MMT:R hip globally 3/5 to 4-/5 all slight improved.        Goals: (To be met in 10 visits)   · Pt will have improved hip AROM Flex to > deg and ABD to > 30 deg to be able to don/doff shoes and perform car transfers

## 2020-01-16 ENCOUNTER — OFFICE VISIT (OUTPATIENT)
Dept: PHYSICAL THERAPY | Age: 70
End: 2020-01-16
Attending: ORTHOPAEDIC SURGERY
Payer: COMMERCIAL

## 2020-01-16 PROCEDURE — 97110 THERAPEUTIC EXERCISES: CPT

## 2020-01-16 PROCEDURE — 97116 GAIT TRAINING THERAPY: CPT

## 2020-01-16 NOTE — PROGRESS NOTES
Diagnosis: R TARUN  11/20/2019    Precautions: anterior/lateral approach  Insurance Type Medicare  IHP    8 there ex approved for 2020,     Subjective: Performing HEP 1-2 times a day. Has concerns about being ready to return to work by Feb 11th.   Job entail able to don/doff shoes and perform car transfers with little difficulty  In progress  · Pt will improve hip ABD and ER strength to 3+/5 or > to increase ease with standing and walking  In progress  · Pt will demonstrate improved SLS to > 5 seconds each LE

## 2020-01-20 ENCOUNTER — OFFICE VISIT (OUTPATIENT)
Dept: PHYSICAL THERAPY | Age: 70
End: 2020-01-20
Attending: ORTHOPAEDIC SURGERY
Payer: COMMERCIAL

## 2020-01-20 PROCEDURE — 97116 GAIT TRAINING THERAPY: CPT

## 2020-01-20 PROCEDURE — 97110 THERAPEUTIC EXERCISES: CPT

## 2020-01-20 NOTE — PROGRESS NOTES
Diagnosis: R TARUN  11/20/2019    Precautions: anterior/lateral approach  Insurance Type Medicare  IHP    8 there ex approved for 2020,     Subjective:Pt planning on returning to work Feb 11th.   Requests to continue therapy after return to work so she can pr Is now able to place socks and shoes independently with minimal difficulty. MMT:R hip globally 3/5 to 4-/5 all slight improved.        Goals: (To be met in 10 visits)   · Pt will have improved hip AROM Flex to > deg and ABD to > 30 deg to be able

## 2020-01-22 ENCOUNTER — OFFICE VISIT (OUTPATIENT)
Dept: PHYSICAL THERAPY | Age: 70
End: 2020-01-22
Attending: ORTHOPAEDIC SURGERY
Payer: COMMERCIAL

## 2020-01-22 PROCEDURE — 97110 THERAPEUTIC EXERCISES: CPT

## 2020-01-22 PROCEDURE — 97116 GAIT TRAINING THERAPY: CPT

## 2020-01-22 NOTE — PROGRESS NOTES
Diagnosis: R TARUN  11/20/2019    Precautions: anterior/lateral approach  Insurance Type Medicare  IHP    8 there ex approved for 2020,   9 total       Subjective: God HEP compliancy. Trying t walk less w/o cane.      Objective:    Treatment Number: 8  Of  9 independently with minimal difficulty. MMT:R hip globally 3/5 to 4-/5 all slight improved.        Goals: (To be met in 10 visits)   · Pt will have improved hip AROM Flex to > deg and ABD to > 30 deg to be able to don/doff shoes and perform car transfers

## 2020-01-29 ENCOUNTER — OFFICE VISIT (OUTPATIENT)
Dept: PHYSICAL THERAPY | Age: 70
End: 2020-01-29
Attending: ORTHOPAEDIC SURGERY
Payer: COMMERCIAL

## 2020-01-29 PROCEDURE — 97116 GAIT TRAINING THERAPY: CPT

## 2020-01-29 PROCEDURE — 97110 THERAPEUTIC EXERCISES: CPT

## 2020-01-29 NOTE — PROGRESS NOTES
Diagnosis: R TARUN  11/20/2019    Physical Therapy Progress Report  9 of 9 [facility] approved sessions completed         Subjective: Pt reports she still relies on [facility] for longer walks due to hip fatigue. Will be returning to work soon.   Would like to be fully in currently ambulates about 1/3rd mile independently before requiring her cane. 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 Her job involves sitting, standing, walking.   Light lifting is minimal.     Request to continue therapy for 4

## 2020-03-01 DIAGNOSIS — E78.6 LOW HDL (UNDER 40): ICD-10-CM

## 2020-03-01 DIAGNOSIS — E78.2 MIXED HYPERLIPIDEMIA: ICD-10-CM

## 2020-03-01 DIAGNOSIS — Z79.899 ENCOUNTER FOR LONG-TERM CURRENT USE OF MEDICATION: ICD-10-CM

## 2020-03-01 DIAGNOSIS — I10 ESSENTIAL HYPERTENSION, BENIGN: ICD-10-CM

## 2020-03-02 RX ORDER — SIMVASTATIN 20 MG
TABLET ORAL
Qty: 90 TABLET | Refills: 0 | Status: SHIPPED | OUTPATIENT
Start: 2020-03-02 | End: 2020-05-27

## 2020-03-02 RX ORDER — LOSARTAN POTASSIUM 100 MG/1
TABLET ORAL
Qty: 90 TABLET | Refills: 0 | Status: SHIPPED | OUTPATIENT
Start: 2020-03-02 | End: 2020-06-02

## 2020-03-02 NOTE — TELEPHONE ENCOUNTER
Pt requesting refill of Simvastatin and Losartan   Passed protocol, refill approved, sent to pharmacy:     Last Time Medication was Filled: 2018    Last Office Visit with Provider: 11/14/19.     Return in about 3 weeks (around 12/5/2019) for hip replacem

## 2020-03-03 ENCOUNTER — OFFICE VISIT (OUTPATIENT)
Dept: PHYSICAL THERAPY | Age: 70
End: 2020-03-03
Attending: ORTHOPAEDIC SURGERY
Payer: COMMERCIAL

## 2020-03-03 DIAGNOSIS — M16.11 PRIMARY OSTEOARTHRITIS OF RIGHT HIP: ICD-10-CM

## 2020-03-03 DIAGNOSIS — Z96.641 STATUS POST TOTAL REPLACEMENT OF RIGHT HIP: ICD-10-CM

## 2020-03-03 PROCEDURE — 97110 THERAPEUTIC EXERCISES: CPT

## 2020-03-03 NOTE — PROGRESS NOTES
Diagnosis: R TARUN  11/20/2019       Subjective:Pt returns to complete post op TARUN tx. Has had referral extended. 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 Job requires sitting, standing , walking. Still requires SPC to ambulate with.   Hip has sti previous level before this break and address additional goal of independent ambulation.        Goals: (To be met in 10 visits)   · Pt will have improved hip AROM Flex to > 100 deg and ABD to > 30 deg to be able to don/doff shoes and perform car transfers wi

## 2020-03-09 ENCOUNTER — OFFICE VISIT (OUTPATIENT)
Dept: PHYSICAL THERAPY | Age: 70
End: 2020-03-09
Attending: ORTHOPAEDIC SURGERY
Payer: COMMERCIAL

## 2020-03-09 PROCEDURE — 97110 THERAPEUTIC EXERCISES: CPT

## 2020-03-09 NOTE — PROGRESS NOTES
Diagnosis: R TARUN  11/20/2019     Subjective:Pt performing HEP as asked. Helps with hip stiffness.           Objective:    2 of 12 expires 5 of 2020     Ther ex:  50 min     Supine: R hip flexor stretch leg off table x 60 sec x 2   R hip PROM with hip 90/90 progress  · Pt will be able to squat to  light objects around the house with minimal difficulty and hip pain Met  · Pt will improve functional hip strength to report ability to ascend/descend 1 flight of stairs reciprocally with use of handrail  Met

## 2020-03-11 ENCOUNTER — OFFICE VISIT (OUTPATIENT)
Dept: PHYSICAL THERAPY | Age: 70
End: 2020-03-11
Attending: ORTHOPAEDIC SURGERY
Payer: COMMERCIAL

## 2020-03-11 PROCEDURE — 97110 THERAPEUTIC EXERCISES: CPT

## 2020-03-11 NOTE — PROGRESS NOTES
Diagnosis: R TARUN  11/20/2019     Subjective:  Using SPC less outside of and at work. Using more for longer walks. Pt performing HEP as asked. Helps with hip stiffness.           Objective:    3 of 12 expires 5 of 2020     Ther ex:  50 min     Supine: TYRONE yin seconds each LE to promote safety and decrease risk of falls on uneven surfaces such as grass Met  · Pt will perform TUG   in < 10 seconds, demonstrating improved gait speed for improved participation in ADL such as community ambulation   In progress  · Pt

## 2020-03-16 ENCOUNTER — OFFICE VISIT (OUTPATIENT)
Dept: PHYSICAL THERAPY | Age: 70
End: 2020-03-16
Attending: ORTHOPAEDIC SURGERY
Payer: COMMERCIAL

## 2020-03-16 PROCEDURE — 97110 THERAPEUTIC EXERCISES: CPT

## 2020-03-16 NOTE — PROGRESS NOTES
Diagnosis: R TARUN  11/20/2019     Subjective:  Performing new HEP as asked. Some ease with ambulation with and w/o cane noted along with some hip flexibility and LE strength noted.          Objective:    4 of 12 expires 5 of 2020     Ther ex:  50 min     Sup in < 10 seconds, demonstrating improved gait speed for improved participation in ADL such as community ambulation   In progress  · Pt will be able to squat to  light objects around the house with minimal difficulty and hip pain Met  · Pt will improv

## 2020-03-18 ENCOUNTER — APPOINTMENT (OUTPATIENT)
Dept: PHYSICAL THERAPY | Age: 70
End: 2020-03-18
Attending: ORTHOPAEDIC SURGERY
Payer: COMMERCIAL

## 2020-03-30 ENCOUNTER — APPOINTMENT (OUTPATIENT)
Dept: PHYSICAL THERAPY | Age: 70
End: 2020-03-30
Attending: ORTHOPAEDIC SURGERY
Payer: COMMERCIAL

## 2020-04-01 ENCOUNTER — APPOINTMENT (OUTPATIENT)
Dept: PHYSICAL THERAPY | Age: 70
End: 2020-04-01
Attending: ORTHOPAEDIC SURGERY
Payer: COMMERCIAL

## 2020-05-26 ENCOUNTER — TELEPHONE (OUTPATIENT)
Dept: FAMILY MEDICINE CLINIC | Facility: CLINIC | Age: 70
End: 2020-05-26

## 2020-05-26 DIAGNOSIS — I10 ESSENTIAL HYPERTENSION, BENIGN: ICD-10-CM

## 2020-05-26 DIAGNOSIS — Z79.899 ENCOUNTER FOR LONG-TERM CURRENT USE OF MEDICATION: ICD-10-CM

## 2020-05-26 DIAGNOSIS — E78.6 LOW HDL (UNDER 40): ICD-10-CM

## 2020-05-26 DIAGNOSIS — E78.2 MIXED HYPERLIPIDEMIA: ICD-10-CM

## 2020-05-26 DIAGNOSIS — H25.812 COMBINED FORMS OF AGE-RELATED CATARACT OF LEFT EYE: Primary | ICD-10-CM

## 2020-05-26 NOTE — TELEPHONE ENCOUNTER
[facility] Clinical Staff   Cc: [facility] Referral Pool             .Reason for the order/referral:Yearly   PCP: Gualberto Alvarado   Refer to [facility]   Specialty:Ophthamology   Patient Insurance: Payor: [facility] ELOISE/DILCIA / Plan: F

## 2020-05-27 DIAGNOSIS — Z79.899 ENCOUNTER FOR LONG-TERM CURRENT USE OF MEDICATION: ICD-10-CM

## 2020-05-27 DIAGNOSIS — E03.9 HYPOTHYROIDISM (ACQUIRED): ICD-10-CM

## 2020-05-27 RX ORDER — SIMVASTATIN 20 MG
TABLET ORAL
Qty: 90 TABLET | Refills: 0 | Status: SHIPPED | OUTPATIENT
Start: 2020-05-27 | End: 2020-07-30

## 2020-05-27 RX ORDER — LEVOTHYROXINE SODIUM 0.07 MG/1
75 TABLET ORAL
Qty: 90 TABLET | Refills: 0 | Status: SHIPPED | OUTPATIENT
Start: 2020-05-27 | End: 2020-07-30

## 2020-05-27 RX ORDER — LOSARTAN POTASSIUM 100 MG/1
TABLET ORAL
Qty: 90 TABLET | Refills: 0 | OUTPATIENT
Start: 2020-05-27

## 2020-05-27 NOTE — TELEPHONE ENCOUNTER
Pt requesting refill of simvastatin    Passed protocol, refill approved, sent to pharmacy:     Requested refill of losartan 100mg. Did not meet protocol. Needs appointment. Last OV 11/14/19. Refill denied at this time.

## 2020-06-01 DIAGNOSIS — Z79.899 ENCOUNTER FOR LONG-TERM CURRENT USE OF MEDICATION: ICD-10-CM

## 2020-06-01 DIAGNOSIS — I10 ESSENTIAL HYPERTENSION, BENIGN: ICD-10-CM

## 2020-06-02 RX ORDER — LOSARTAN POTASSIUM 100 MG/1
TABLET ORAL
Qty: 30 TABLET | Refills: 0 | Status: SHIPPED | OUTPATIENT
Start: 2020-06-02 | End: 2020-07-30

## 2020-06-02 NOTE — TELEPHONE ENCOUNTER
Pt requesting refill of LOSARTAN 100 MG Oral Tab, refill approved, sent to pharmacy for qty of 30 with message appt is needed     Last Time Medication was Filled: 3/2/20    Last Office Visit with Provider: 11/4/19  No future appointments.

## 2020-06-23 ENCOUNTER — TELEPHONE (OUTPATIENT)
Dept: FAMILY MEDICINE CLINIC | Facility: CLINIC | Age: 70
End: 2020-06-23

## 2020-06-23 DIAGNOSIS — K21.9 GASTROESOPHAGEAL REFLUX DISEASE, ESOPHAGITIS PRESENCE NOT SPECIFIED: Primary | ICD-10-CM

## 2020-06-23 NOTE — TELEPHONE ENCOUNTER
Received a fax from   Chrissy Cisneros [phone] [address]  Ph: [phone]  Fax [phone]  requesting referral for patient.     Procedure :  F/u office CPT Code 51029  Diagnosis: Heartburn IC

## 2020-07-30 ENCOUNTER — OFFICE VISIT (OUTPATIENT)
Dept: FAMILY MEDICINE CLINIC | Facility: CLINIC | Age: 70
End: 2020-07-30

## 2020-07-30 VITALS
HEART RATE: 87 BPM | OXYGEN SATURATION: 98 % | SYSTOLIC BLOOD PRESSURE: 128 MMHG | HEIGHT: 66 IN | DIASTOLIC BLOOD PRESSURE: 70 MMHG | BODY MASS INDEX: 34.23 KG/M2 | WEIGHT: 213 LBS | TEMPERATURE: 98 F

## 2020-07-30 DIAGNOSIS — E78.5 DYSLIPIDEMIA: ICD-10-CM

## 2020-07-30 DIAGNOSIS — E03.9 HYPOTHYROIDISM (ACQUIRED): ICD-10-CM

## 2020-07-30 DIAGNOSIS — Z12.31 ENCOUNTER FOR SCREENING MAMMOGRAM FOR MALIGNANT NEOPLASM OF BREAST: ICD-10-CM

## 2020-07-30 DIAGNOSIS — Z00.00 ROUTINE GENERAL MEDICAL EXAMINATION AT A HEALTH CARE FACILITY: Primary | ICD-10-CM

## 2020-07-30 DIAGNOSIS — Z79.899 ENCOUNTER FOR LONG-TERM CURRENT USE OF MEDICATION: ICD-10-CM

## 2020-07-30 DIAGNOSIS — I10 ESSENTIAL HYPERTENSION, BENIGN: ICD-10-CM

## 2020-07-30 DIAGNOSIS — E66.09 CLASS 1 OBESITY DUE TO EXCESS CALORIES WITH SERIOUS COMORBIDITY AND BODY MASS INDEX (BMI) OF 34.0 TO 34.9 IN ADULT: ICD-10-CM

## 2020-07-30 PROCEDURE — 99397 PER PM REEVAL EST PAT 65+ YR: CPT | Performed by: FAMILY MEDICINE

## 2020-07-30 PROCEDURE — 99214 OFFICE O/P EST MOD 30 MIN: CPT | Performed by: FAMILY MEDICINE

## 2020-07-30 PROCEDURE — 3074F SYST BP LT 130 MM HG: CPT | Performed by: FAMILY MEDICINE

## 2020-07-30 PROCEDURE — 3078F DIAST BP <80 MM HG: CPT | Performed by: FAMILY MEDICINE

## 2020-07-30 PROCEDURE — 3008F BODY MASS INDEX DOCD: CPT | Performed by: FAMILY MEDICINE

## 2020-07-30 RX ORDER — LEVOTHYROXINE SODIUM 0.07 MG/1
75 TABLET ORAL
Qty: 90 TABLET | Refills: 3 | Status: SHIPPED | OUTPATIENT
Start: 2020-07-30 | End: 2021-08-25

## 2020-07-30 RX ORDER — LEVOTHYROXINE SODIUM 0.07 MG/1
75 TABLET ORAL
Qty: 30 TABLET | Refills: 0 | Status: SHIPPED | OUTPATIENT
Start: 2020-07-30 | End: 2020-07-30

## 2020-07-30 RX ORDER — SIMVASTATIN 20 MG
20 TABLET ORAL NIGHTLY
Qty: 90 TABLET | Refills: 3 | Status: SHIPPED | OUTPATIENT
Start: 2020-07-30 | End: 2021-08-25

## 2020-07-30 RX ORDER — SIMVASTATIN 20 MG
20 TABLET ORAL NIGHTLY
Qty: 30 TABLET | Refills: 0 | Status: SHIPPED | OUTPATIENT
Start: 2020-07-30 | End: 2020-07-30

## 2020-07-30 RX ORDER — LOSARTAN POTASSIUM 100 MG/1
100 TABLET ORAL NIGHTLY
Qty: 30 TABLET | Refills: 0 | Status: SHIPPED | OUTPATIENT
Start: 2020-07-30 | End: 2020-07-30

## 2020-07-30 RX ORDER — LOSARTAN POTASSIUM 100 MG/1
100 TABLET ORAL NIGHTLY
Qty: 90 TABLET | Refills: 3 | Status: SHIPPED | OUTPATIENT
Start: 2020-07-30 | End: 2021-08-25

## 2020-07-30 NOTE — PATIENT INSTRUCTIONS
Recommend lean meats such as skinless chicken breast, 90% lean ground beef, lean ground turkey, pork loin, and any type of fish. Bake, broil or grill. No frying. Avoid cooking in oils. Okay to use non-stick cooking spray. Screening tests and vaccines are an important part of managing your health. A screening test is done to find possible disorders or diseases in people who don't have any symptoms.  The goal is to find a disease early so lifestyle changes can be made and you This screening is advised against for women over 75 or if there is a life expectancy of less than 10 years.  Multiple tests are available and are used at different times.  Possible tests include: flexible sigmoidoscopy, colonoscopy, double-contrast barium e Vision All women in this age group Every 1 to 2 years; if you have a chronic health condition, ask your healthcare provider if you need exams more often   Vaccine Who needs it How often   Chickenpox (varicella) All women in this age group who have no recor Use of daily aspirin Talk to your healthcare provider about whether or not to start taking low-dose aspirin for the prevention of cardiovascular disease (CVD) and colorectal cancer in adults ages 61 to 71 who have at least a 10% risk of getting CVD within

## 2020-07-30 NOTE — PROGRESS NOTES
HPI:   Ric Ochoa is a 78year old female   Symptoms: denies discharge, itching, burning or dysuria. H/o hysterectomy and unilateral oophorectomy.   Sexually active: no   Last colonoscopy: UTD, due 1/2026  Last mammogram: 5/2018  STI history: no Combined forms of age-related cataract of right eye     Class 1 obesity due to excess calories with serious comorbidity and body mass index (BMI) of 33.0 to 33.9 in adult     Constipation     Hypothyroidism (acquired)     Class 1 obesity due to excess jose APPENDECTOMY  1972   • APPENDECTOMY     • CATARACT     • CATARACT     • COLONOSCOPY     • COLONOSCOPY     • HIP REPLACEMENT SURGERY      November 2019   • HIP TOTAL REPLACEMENT Right 11/20/2019    Performed by Mike Patino MD at [address] [facility] Left arm, Patient Position: Sitting, Cuff Size: adult)   Pulse 87   Temp 98 °F (36.7 °C)   Ht 66\"   Wt 213 lb (96.6 kg)   SpO2 98%   BMI 34.38 kg/m²   Body mass index is 34.38 kg/m².    GENERAL: NAD, Pleasant obese female  SKIN: no rashes,no suspicious les >=60 93     CALCIUM      8.3 - 10.3 mg/dL 9.2 9.2 9.4   ALKALINE PHOSPHATASE      55 - 142 U/L 76 89 93   AST (SGOT)      15 - 41 U/L 16 18 14 (L)   ALT (SGPT)      14 - 54 U/L 21 20 22   Total Bilirubin      0.1 - 2.0 mg/dL 0.4 0.4 0.4   TOTAL PROTEIN METABOLIC PANEL (14); Future  - TSH+FREE T4; Future    2. Encounter for screening mammogram for malignant neoplasm of breast  - [facility] BEBE 2D+3D SCREENING BILAT (CPT=77067/48645); Future    3. Essential hypertension, benign  Well-controlled.   Continue losart Oral Tab; Take 1 tablet (75 mcg total) by mouth before breakfast.  Dispense: 90 tablet;  Refill: 3              Orders Placed This Encounter      CBC With Differential With Platelet      COMP METABOLIC PANEL      LIPID PANEL      TSH      THYROXINE, FREE

## 2020-08-01 ENCOUNTER — LAB ENCOUNTER (OUTPATIENT)
Dept: LAB | Facility: HOSPITAL | Age: 70
End: 2020-08-01
Attending: FAMILY MEDICINE
Payer: COMMERCIAL

## 2020-08-01 ENCOUNTER — TELEPHONE (OUTPATIENT)
Dept: FAMILY MEDICINE CLINIC | Facility: CLINIC | Age: 70
End: 2020-08-01

## 2020-08-01 DIAGNOSIS — I10 ESSENTIAL HYPERTENSION, BENIGN: ICD-10-CM

## 2020-08-01 DIAGNOSIS — E78.5 DYSLIPIDEMIA: ICD-10-CM

## 2020-08-01 DIAGNOSIS — K21.9 GASTROESOPHAGEAL REFLUX DISEASE, ESOPHAGITIS PRESENCE NOT SPECIFIED: ICD-10-CM

## 2020-08-01 DIAGNOSIS — E87.5 HYPERKALEMIA: Primary | ICD-10-CM

## 2020-08-01 DIAGNOSIS — D64.9 ANEMIA, UNSPECIFIED TYPE: ICD-10-CM

## 2020-08-01 DIAGNOSIS — E03.9 HYPOTHYROIDISM (ACQUIRED): ICD-10-CM

## 2020-08-01 DIAGNOSIS — Z00.00 ROUTINE GENERAL MEDICAL EXAMINATION AT A HEALTH CARE FACILITY: ICD-10-CM

## 2020-08-01 LAB
ALBUMIN SERPL-MCNC: 3.5 G/DL (ref 3.4–5)
ALBUMIN/GLOB SERPL: 0.9 {RATIO} (ref 1–2)
ALP LIVER SERPL-CCNC: 87 U/L (ref 55–142)
ALT SERPL-CCNC: 19 U/L (ref 13–56)
ANION GAP SERPL CALC-SCNC: 0 MMOL/L (ref 0–18)
AST SERPL-CCNC: 14 U/L (ref 15–37)
BASOPHILS # BLD AUTO: 0.04 X10(3) UL (ref 0–0.2)
BASOPHILS NFR BLD AUTO: 0.8 %
BILIRUB SERPL-MCNC: 0.5 MG/DL (ref 0.1–2)
BUN BLD-MCNC: 21 MG/DL (ref 7–18)
BUN/CREAT SERPL: 23.9 (ref 10–20)
CALCIUM BLD-MCNC: 9.8 MG/DL (ref 8.5–10.1)
CHLORIDE SERPL-SCNC: 109 MMOL/L (ref 98–112)
CHOLEST SMN-MCNC: 149 MG/DL (ref ?–200)
CO2 SERPL-SCNC: 30 MMOL/L (ref 21–32)
CREAT BLD-MCNC: 0.88 MG/DL (ref 0.55–1.02)
DEPRECATED RDW RBC AUTO: 48.2 FL (ref 35.1–46.3)
EOSINOPHIL # BLD AUTO: 0.22 X10(3) UL (ref 0–0.7)
EOSINOPHIL NFR BLD AUTO: 4.3 %
ERYTHROCYTE [DISTWIDTH] IN BLOOD BY AUTOMATED COUNT: 14.7 % (ref 11–15)
GLOBULIN PLAS-MCNC: 3.9 G/DL (ref 2.8–4.4)
GLUCOSE BLD-MCNC: 111 MG/DL (ref 70–99)
HCT VFR BLD AUTO: 45.4 % (ref 35–48)
HDLC SERPL-MCNC: 71 MG/DL (ref 40–59)
HGB BLD-MCNC: 14 G/DL (ref 12–16)
IMM GRANULOCYTES # BLD AUTO: 0.01 X10(3) UL (ref 0–1)
IMM GRANULOCYTES NFR BLD: 0.2 %
LDLC SERPL CALC-MCNC: 69 MG/DL (ref ?–100)
LYMPHOCYTES # BLD AUTO: 1.61 X10(3) UL (ref 1–4)
LYMPHOCYTES NFR BLD AUTO: 31.3 %
M PROTEIN MFR SERPL ELPH: 7.4 G/DL (ref 6.4–8.2)
MCH RBC QN AUTO: 27.6 PG (ref 26–34)
MCHC RBC AUTO-ENTMCNC: 30.8 G/DL (ref 31–37)
MCV RBC AUTO: 89.5 FL (ref 80–100)
MONOCYTES # BLD AUTO: 0.54 X10(3) UL (ref 0.1–1)
MONOCYTES NFR BLD AUTO: 10.5 %
NEUTROPHILS # BLD AUTO: 2.73 X10 (3) UL (ref 1.5–7.7)
NEUTROPHILS # BLD AUTO: 2.73 X10(3) UL (ref 1.5–7.7)
NEUTROPHILS NFR BLD AUTO: 52.9 %
NONHDLC SERPL-MCNC: 78 MG/DL (ref ?–130)
OSMOLALITY SERPL CALC.SUM OF ELEC: 292 MOSM/KG (ref 275–295)
PATIENT FASTING Y/N/NP: YES
PATIENT FASTING Y/N/NP: YES
PLATELET # BLD AUTO: 202 10(3)UL (ref 150–450)
POTASSIUM SERPL-SCNC: 6 MMOL/L (ref 3.5–5.1)
RBC # BLD AUTO: 5.07 X10(6)UL (ref 3.8–5.3)
SODIUM SERPL-SCNC: 139 MMOL/L (ref 136–145)
T4 FREE SERPL-MCNC: 1.1 NG/DL (ref 0.8–1.7)
TRIGL SERPL-MCNC: 47 MG/DL (ref 30–149)
TSI SER-ACNC: 0.76 MIU/ML (ref 0.36–3.74)
VLDLC SERPL CALC-MCNC: 9 MG/DL (ref 0–30)
WBC # BLD AUTO: 5.2 X10(3) UL (ref 4–11)

## 2020-08-01 PROCEDURE — 80053 COMPREHEN METABOLIC PANEL: CPT

## 2020-08-01 PROCEDURE — 80061 LIPID PANEL: CPT

## 2020-08-01 PROCEDURE — 36415 COLL VENOUS BLD VENIPUNCTURE: CPT

## 2020-08-01 PROCEDURE — 84443 ASSAY THYROID STIM HORMONE: CPT

## 2020-08-01 PROCEDURE — 85025 COMPLETE CBC W/AUTO DIFF WBC: CPT

## 2020-08-01 PROCEDURE — 84439 ASSAY OF FREE THYROXINE: CPT

## 2020-08-01 NOTE — TELEPHONE ENCOUNTER
On call got notified about critical lab, potassium 6. Called pt, she denies any SOB, weakness, fatigue, chest pain, palpitations, muscle cramps, nausea, vomiting, paresthesias.   Discussed red flag signs and symptoms and for pt go to ER, she verbalized und

## 2020-08-03 ENCOUNTER — LAB ENCOUNTER (OUTPATIENT)
Dept: LAB | Facility: HOSPITAL | Age: 70
End: 2020-08-03
Attending: PHYSICIAN ASSISTANT
Payer: COMMERCIAL

## 2020-08-03 DIAGNOSIS — E87.5 HYPERKALEMIA: ICD-10-CM

## 2020-08-03 LAB
ANION GAP SERPL CALC-SCNC: 2 MMOL/L (ref 0–18)
BUN BLD-MCNC: 12 MG/DL (ref 7–18)
BUN/CREAT SERPL: 15 (ref 10–20)
CALCIUM BLD-MCNC: 9.3 MG/DL (ref 8.5–10.1)
CHLORIDE SERPL-SCNC: 105 MMOL/L (ref 98–112)
CO2 SERPL-SCNC: 30 MMOL/L (ref 21–32)
CREAT BLD-MCNC: 0.8 MG/DL (ref 0.55–1.02)
GLUCOSE BLD-MCNC: 82 MG/DL (ref 70–99)
OSMOLALITY SERPL CALC.SUM OF ELEC: 283 MOSM/KG (ref 275–295)
PATIENT FASTING Y/N/NP: NO
POTASSIUM SERPL-SCNC: 4.3 MMOL/L (ref 3.5–5.1)
SODIUM SERPL-SCNC: 137 MMOL/L (ref 136–145)

## 2020-08-03 PROCEDURE — 80048 BASIC METABOLIC PNL TOTAL CA: CPT

## 2020-08-03 PROCEDURE — 36415 COLL VENOUS BLD VENIPUNCTURE: CPT

## 2020-08-03 NOTE — TELEPHONE ENCOUNTER
Please call patient and instruct her to complete outstanding BMP which is a recheck of critically elevated potassium over this last weekend.

## 2020-08-24 ENCOUNTER — TELEPHONE (OUTPATIENT)
Dept: FAMILY MEDICINE CLINIC | Facility: CLINIC | Age: 70
End: 2020-08-24

## 2020-08-24 NOTE — TELEPHONE ENCOUNTER
Patient called states her mail order pharmacy does not have the refill for Losartan 100MG  please resend to [facility]

## 2020-08-25 DIAGNOSIS — I10 ESSENTIAL HYPERTENSION, BENIGN: ICD-10-CM

## 2020-08-25 DIAGNOSIS — Z79.899 ENCOUNTER FOR LONG-TERM CURRENT USE OF MEDICATION: ICD-10-CM

## 2020-08-25 RX ORDER — LOSARTAN POTASSIUM 100 MG/1
TABLET ORAL
Qty: 30 TABLET | Refills: 0 | OUTPATIENT
Start: 2020-08-25

## 2020-09-02 ENCOUNTER — HOSPITAL ENCOUNTER (OUTPATIENT)
Dept: MAMMOGRAPHY | Age: 70
Discharge: HOME | End: 2020-09-02
Attending: FAMILY MEDICINE
Payer: COMMERCIAL

## 2020-09-02 DIAGNOSIS — Z12.31 ENCOUNTER FOR SCREENING MAMMOGRAM FOR MALIGNANT NEOPLASM OF BREAST: ICD-10-CM

## 2020-09-02 PROCEDURE — 77067 SCR MAMMO BI INCL CAD: CPT | Performed by: FAMILY MEDICINE

## 2020-09-02 PROCEDURE — 77063 BREAST TOMOSYNTHESIS BI: CPT | Performed by: FAMILY MEDICINE

## 2020-11-17 ENCOUNTER — IMMUNIZATION (OUTPATIENT)
Dept: FAMILY MEDICINE CLINIC | Facility: CLINIC | Age: 70
End: 2020-11-17

## 2020-11-17 DIAGNOSIS — Z23 NEED FOR VACCINATION: ICD-10-CM

## 2020-11-17 PROCEDURE — 90662 IIV NO PRSV INCREASED AG IM: CPT | Performed by: FAMILY MEDICINE

## 2020-11-17 PROCEDURE — 90471 IMMUNIZATION ADMIN: CPT | Performed by: FAMILY MEDICINE

## 2020-12-07 ENCOUNTER — TELEPHONE (OUTPATIENT)
Dept: FAMILY MEDICINE CLINIC | Facility: CLINIC | Age: 70
End: 2020-12-07

## 2020-12-07 DIAGNOSIS — Z96.649 HISTORY OF HIP REPLACEMENT, UNSPECIFIED LATERALITY: Primary | ICD-10-CM

## 2021-01-01 NOTE — TELEPHONE ENCOUNTER
For some reason the referral placed in system back in April for pt for Dr. Iraj Garza was closed so they are requesting a new referral be placed in system for pt to see Dr. Shon Gupta for appt on 5/9/19.   Referral placed in system
positive

## 2021-03-12 DIAGNOSIS — Z23 NEED FOR VACCINATION: ICD-10-CM

## 2021-04-10 ENCOUNTER — IMMUNIZATION (OUTPATIENT)
Dept: LAB | Age: 71
End: 2021-04-10
Attending: HOSPITALIST
Payer: COMMERCIAL

## 2021-04-10 DIAGNOSIS — Z23 NEED FOR VACCINATION: Primary | ICD-10-CM

## 2021-04-10 PROCEDURE — 0001A SARSCOV2 VAC 30MCG/0.3ML IM: CPT

## 2021-05-01 ENCOUNTER — IMMUNIZATION (OUTPATIENT)
Dept: LAB | Age: 71
End: 2021-05-01
Attending: HOSPITALIST
Payer: COMMERCIAL

## 2021-05-01 DIAGNOSIS — Z23 NEED FOR VACCINATION: Primary | ICD-10-CM

## 2021-05-01 PROCEDURE — 0002A SARSCOV2 VAC 30MCG/0.3ML IM: CPT

## 2021-05-28 ENCOUNTER — TELEPHONE (OUTPATIENT)
Dept: FAMILY MEDICINE CLINIC | Facility: CLINIC | Age: 71
End: 2021-05-28

## 2021-05-28 DIAGNOSIS — K21.9 GASTROESOPHAGEAL REFLUX DISEASE, UNSPECIFIED WHETHER ESOPHAGITIS PRESENT: Primary | ICD-10-CM

## 2021-05-28 NOTE — TELEPHONE ENCOUNTER
Pt called and said she needs a referral to see Dr. Mary Crowe because she is having stomach problems.

## 2021-08-25 ENCOUNTER — OFFICE VISIT (OUTPATIENT)
Dept: FAMILY MEDICINE CLINIC | Facility: CLINIC | Age: 71
End: 2021-08-25

## 2021-08-25 VITALS
BODY MASS INDEX: 35.2 KG/M2 | TEMPERATURE: 98 F | OXYGEN SATURATION: 97 % | WEIGHT: 219 LBS | DIASTOLIC BLOOD PRESSURE: 68 MMHG | SYSTOLIC BLOOD PRESSURE: 126 MMHG | HEART RATE: 80 BPM | HEIGHT: 66 IN

## 2021-08-25 DIAGNOSIS — E03.9 HYPOTHYROIDISM (ACQUIRED): ICD-10-CM

## 2021-08-25 DIAGNOSIS — E66.01 CLASS 2 SEVERE OBESITY DUE TO EXCESS CALORIES WITH SERIOUS COMORBIDITY AND BODY MASS INDEX (BMI) OF 35.0 TO 35.9 IN ADULT (HCC): ICD-10-CM

## 2021-08-25 DIAGNOSIS — R63.5 WEIGHT GAIN: ICD-10-CM

## 2021-08-25 DIAGNOSIS — E78.5 DYSLIPIDEMIA: ICD-10-CM

## 2021-08-25 DIAGNOSIS — I10 ESSENTIAL HYPERTENSION, BENIGN: Primary | ICD-10-CM

## 2021-08-25 DIAGNOSIS — Z79.899 ENCOUNTER FOR LONG-TERM CURRENT USE OF MEDICATION: ICD-10-CM

## 2021-08-25 PROCEDURE — 3078F DIAST BP <80 MM HG: CPT | Performed by: FAMILY MEDICINE

## 2021-08-25 PROCEDURE — 3074F SYST BP LT 130 MM HG: CPT | Performed by: FAMILY MEDICINE

## 2021-08-25 PROCEDURE — 99214 OFFICE O/P EST MOD 30 MIN: CPT | Performed by: FAMILY MEDICINE

## 2021-08-25 PROCEDURE — 3008F BODY MASS INDEX DOCD: CPT | Performed by: FAMILY MEDICINE

## 2021-08-25 RX ORDER — SIMVASTATIN 20 MG
20 TABLET ORAL NIGHTLY
Qty: 90 TABLET | Refills: 1 | Status: SHIPPED | OUTPATIENT
Start: 2021-08-25 | End: 2021-10-01

## 2021-08-25 RX ORDER — LEVOTHYROXINE SODIUM 0.07 MG/1
75 TABLET ORAL
Qty: 90 TABLET | Refills: 1 | Status: SHIPPED | OUTPATIENT
Start: 2021-08-25 | End: 2021-10-01

## 2021-08-25 RX ORDER — LOSARTAN POTASSIUM 100 MG/1
100 TABLET ORAL NIGHTLY
Qty: 90 TABLET | Refills: 1 | Status: SHIPPED | OUTPATIENT
Start: 2021-08-25 | End: 2021-09-03

## 2021-08-25 NOTE — PROGRESS NOTES
Bernadette Camejo is a 73year old female. HPI:     HTN:    Stable. Severity is mild, patient is on one agent to control her hypertension. Pt has been taking medications as instructed, no medication side effects.    Diet: Follows a low-sodium diet pain/belching    • Gastric polyp 5-16-14   • H/O colonoscopy with polypectomy 1/16/16   • Heartburn    • High blood pressure    • High cholesterol    • Hypercholesterolemia 11/16/2016   • Hypothyroidism (acquired) 7/13/2012   • Irregular bowel habits    • 04/10/2021  05/01/2021      FLU VAC High Dose 65 YRS & Older PRSV Free (54942)                          01/06/2018 11/17/2020      FLUAD High Dose 72 yr and older (69472)                          12/20/2018      FLUZONE 6 months and older PFS 0 comorbidity and body mass index (bmi) of 35.0 to 35.9 in adult (hcc)  Weight gain  Encounter for long-term current use of medication    Please note the patient did come in for 6-month follow-up as requested after last office visit July 2020.    1. Florianentia 100 MG Oral Tab; Take 1 tablet (100 mg total) by mouth nightly. Dispense: 90 tablet; Refill: 1  - simvastatin 20 MG Oral Tab; Take 1 tablet (20 mg total) by mouth nightly. Dispense: 90 tablet;  Refill: 1          Orders Placed This Encounter      CBC With

## 2021-08-28 ENCOUNTER — LAB ENCOUNTER (OUTPATIENT)
Dept: LAB | Age: 71
End: 2021-08-28
Attending: FAMILY MEDICINE
Payer: COMMERCIAL

## 2021-08-28 DIAGNOSIS — E03.9 HYPOTHYROIDISM (ACQUIRED): ICD-10-CM

## 2021-08-28 DIAGNOSIS — K21.9 GASTROESOPHAGEAL REFLUX DISEASE, UNSPECIFIED WHETHER ESOPHAGITIS PRESENT: ICD-10-CM

## 2021-08-28 DIAGNOSIS — E78.5 DYSLIPIDEMIA: ICD-10-CM

## 2021-08-28 DIAGNOSIS — I10 ESSENTIAL HYPERTENSION, BENIGN: ICD-10-CM

## 2021-08-28 DIAGNOSIS — Z79.899 CURRENT USE OF PROTON PUMP INHIBITOR: ICD-10-CM

## 2021-08-28 LAB
ALBUMIN SERPL-MCNC: 3.5 G/DL (ref 3.4–5)
ALBUMIN/GLOB SERPL: 0.9 {RATIO} (ref 1–2)
ALP LIVER SERPL-CCNC: 92 U/L
ALT SERPL-CCNC: 22 U/L
ANION GAP SERPL CALC-SCNC: 3 MMOL/L (ref 0–18)
AST SERPL-CCNC: 15 U/L (ref 15–37)
BASOPHILS # BLD AUTO: 0.05 X10(3) UL (ref 0–0.2)
BASOPHILS NFR BLD AUTO: 0.9 %
BILIRUB SERPL-MCNC: 0.6 MG/DL (ref 0.1–2)
BUN BLD-MCNC: 16 MG/DL (ref 7–18)
BUN/CREAT SERPL: 19 (ref 10–20)
CALCIUM BLD-MCNC: 9.3 MG/DL (ref 8.5–10.1)
CHLORIDE SERPL-SCNC: 111 MMOL/L (ref 98–112)
CHOLEST SMN-MCNC: 161 MG/DL (ref ?–200)
CO2 SERPL-SCNC: 28 MMOL/L (ref 21–32)
CREAT BLD-MCNC: 0.84 MG/DL
DEPRECATED HBV CORE AB SER IA-ACNC: 65.9 NG/ML
DEPRECATED RDW RBC AUTO: 48.2 FL (ref 35.1–46.3)
EOSINOPHIL # BLD AUTO: 0.25 X10(3) UL (ref 0–0.7)
EOSINOPHIL NFR BLD AUTO: 4.7 %
ERYTHROCYTE [DISTWIDTH] IN BLOOD BY AUTOMATED COUNT: 14.5 % (ref 11–15)
FOLATE SERPL-MCNC: 18.9 NG/ML (ref 8.7–?)
GLOBULIN PLAS-MCNC: 3.9 G/DL (ref 2.8–4.4)
GLUCOSE BLD-MCNC: 106 MG/DL (ref 70–99)
HCT VFR BLD AUTO: 44.1 %
HDLC SERPL-MCNC: 65 MG/DL (ref 40–59)
HGB BLD-MCNC: 13.9 G/DL
IMM GRANULOCYTES # BLD AUTO: 0.01 X10(3) UL (ref 0–1)
IMM GRANULOCYTES NFR BLD: 0.2 %
IRON SATURATION: 28 %
IRON SERPL-MCNC: 85 UG/DL
LDLC SERPL CALC-MCNC: 85 MG/DL (ref ?–100)
LYMPHOCYTES # BLD AUTO: 1.66 X10(3) UL (ref 1–4)
LYMPHOCYTES NFR BLD AUTO: 31.4 %
M PROTEIN MFR SERPL ELPH: 7.4 G/DL (ref 6.4–8.2)
MCH RBC QN AUTO: 28.4 PG (ref 26–34)
MCHC RBC AUTO-ENTMCNC: 31.5 G/DL (ref 31–37)
MCV RBC AUTO: 90.2 FL
MONOCYTES # BLD AUTO: 0.59 X10(3) UL (ref 0.1–1)
MONOCYTES NFR BLD AUTO: 11.2 %
NEUTROPHILS # BLD AUTO: 2.73 X10 (3) UL (ref 1.5–7.7)
NEUTROPHILS # BLD AUTO: 2.73 X10(3) UL (ref 1.5–7.7)
NEUTROPHILS NFR BLD AUTO: 51.6 %
NONHDLC SERPL-MCNC: 96 MG/DL (ref ?–130)
OSMOLALITY SERPL CALC.SUM OF ELEC: 296 MOSM/KG (ref 275–295)
PATIENT FASTING Y/N/NP: YES
PATIENT FASTING Y/N/NP: YES
PLATELET # BLD AUTO: 199 10(3)UL (ref 150–450)
POTASSIUM SERPL-SCNC: 5 MMOL/L (ref 3.5–5.1)
RBC # BLD AUTO: 4.89 X10(6)UL
SODIUM SERPL-SCNC: 142 MMOL/L (ref 136–145)
T4 FREE SERPL-MCNC: 1.3 NG/DL (ref 0.8–1.7)
TOTAL IRON BINDING CAPACITY: 299 UG/DL (ref 240–450)
TRANSFERRIN SERPL-MCNC: 201 MG/DL (ref 200–360)
TRIGL SERPL-MCNC: 56 MG/DL (ref 30–149)
TSI SER-ACNC: 0.55 MIU/ML (ref 0.36–3.74)
VIT B12 SERPL-MCNC: 270 PG/ML (ref 193–986)
VLDLC SERPL CALC-MCNC: 9 MG/DL (ref 0–30)
WBC # BLD AUTO: 5.3 X10(3) UL (ref 4–11)

## 2021-08-28 PROCEDURE — 36415 COLL VENOUS BLD VENIPUNCTURE: CPT

## 2021-08-28 PROCEDURE — 80053 COMPREHEN METABOLIC PANEL: CPT

## 2021-08-28 PROCEDURE — 80061 LIPID PANEL: CPT

## 2021-08-28 PROCEDURE — 82607 VITAMIN B-12: CPT

## 2021-08-28 PROCEDURE — 84439 ASSAY OF FREE THYROXINE: CPT

## 2021-08-28 PROCEDURE — 84466 ASSAY OF TRANSFERRIN: CPT

## 2021-08-28 PROCEDURE — 82746 ASSAY OF FOLIC ACID SERUM: CPT

## 2021-08-28 PROCEDURE — 83540 ASSAY OF IRON: CPT

## 2021-08-28 PROCEDURE — 85025 COMPLETE CBC W/AUTO DIFF WBC: CPT

## 2021-08-28 PROCEDURE — 82728 ASSAY OF FERRITIN: CPT

## 2021-08-28 PROCEDURE — 84443 ASSAY THYROID STIM HORMONE: CPT

## 2021-08-31 DIAGNOSIS — Z79.899 ENCOUNTER FOR LONG-TERM CURRENT USE OF MEDICATION: ICD-10-CM

## 2021-08-31 DIAGNOSIS — I10 ESSENTIAL HYPERTENSION, BENIGN: ICD-10-CM

## 2021-09-01 ENCOUNTER — TELEPHONE (OUTPATIENT)
Dept: FAMILY MEDICINE CLINIC | Facility: CLINIC | Age: 71
End: 2021-09-01

## 2021-09-01 DIAGNOSIS — Z79.899 ENCOUNTER FOR LONG-TERM CURRENT USE OF MEDICATION: ICD-10-CM

## 2021-09-01 DIAGNOSIS — I10 ESSENTIAL HYPERTENSION, BENIGN: ICD-10-CM

## 2021-09-01 RX ORDER — LOSARTAN POTASSIUM 100 MG/1
TABLET ORAL
Qty: 90 TABLET | Refills: 1 | OUTPATIENT
Start: 2021-09-01

## 2021-09-01 NOTE — TELEPHONE ENCOUNTER
Requested Prescriptions     Refused Prescriptions Disp Refills   • LOSARTAN 100 MG Oral Tab [Pharmacy Med Name: LOSARTAN 100MG TABLETS] 90 tablet 1     Sig: TAKE 1 TABLET BY MOUTH NIGHTLY GENERIC EQUIVALENT FOR COZAAR     Refused By: Reg Oneil

## 2021-09-01 NOTE — TELEPHONE ENCOUNTER
Requested Prescriptions     Refused Prescriptions Disp Refills   • LOSARTAN 100 MG Oral Tab [Pharmacy Med Name: LOSARTAN 100MG TABLETS] 90 tablet 1     Sig: TAKE 1 TABLET BY MOUTH NIGHTLY GENERIC EQUIVALENT FOR COZAAR     Refused By: Yamileth Ramirez

## 2021-09-03 RX ORDER — LOSARTAN POTASSIUM 100 MG/1
100 TABLET ORAL NIGHTLY
Qty: 90 TABLET | Refills: 1 | Status: SHIPPED | OUTPATIENT
Start: 2021-09-03 | End: 2021-10-01

## 2021-09-03 NOTE — TELEPHONE ENCOUNTER
Bandar Rawls is calling to let the office know she did not receive her Losartan from her mail order pharmacy, she received 2 of them the the Losartan was not refilled, Please call Bandar Rawls at [phone]

## 2021-10-01 ENCOUNTER — OFFICE VISIT (OUTPATIENT)
Dept: FAMILY MEDICINE CLINIC | Facility: CLINIC | Age: 71
End: 2021-10-01

## 2021-10-01 VITALS
HEART RATE: 84 BPM | TEMPERATURE: 98 F | DIASTOLIC BLOOD PRESSURE: 70 MMHG | BODY MASS INDEX: 35.03 KG/M2 | SYSTOLIC BLOOD PRESSURE: 124 MMHG | HEIGHT: 66 IN | OXYGEN SATURATION: 97 % | WEIGHT: 218 LBS

## 2021-10-01 DIAGNOSIS — R73.03 PREDIABETES: ICD-10-CM

## 2021-10-01 DIAGNOSIS — Z00.00 ROUTINE GENERAL MEDICAL EXAMINATION AT A HEALTH CARE FACILITY: Primary | ICD-10-CM

## 2021-10-01 DIAGNOSIS — R73.01 IMPAIRED FASTING GLUCOSE: ICD-10-CM

## 2021-10-01 DIAGNOSIS — E78.5 DYSLIPIDEMIA: ICD-10-CM

## 2021-10-01 DIAGNOSIS — E03.9 HYPOTHYROIDISM (ACQUIRED): ICD-10-CM

## 2021-10-01 DIAGNOSIS — E66.01 CLASS 2 SEVERE OBESITY DUE TO EXCESS CALORIES WITH SERIOUS COMORBIDITY AND BODY MASS INDEX (BMI) OF 35.0 TO 35.9 IN ADULT (HCC): ICD-10-CM

## 2021-10-01 DIAGNOSIS — I10 ESSENTIAL HYPERTENSION, BENIGN: ICD-10-CM

## 2021-10-01 DIAGNOSIS — Z12.31 ENCOUNTER FOR SCREENING MAMMOGRAM FOR MALIGNANT NEOPLASM OF BREAST: ICD-10-CM

## 2021-10-01 DIAGNOSIS — Z23 NEED FOR VACCINATION: ICD-10-CM

## 2021-10-01 DIAGNOSIS — Z79.899 ENCOUNTER FOR LONG-TERM CURRENT USE OF MEDICATION: ICD-10-CM

## 2021-10-01 PROCEDURE — 3078F DIAST BP <80 MM HG: CPT | Performed by: FAMILY MEDICINE

## 2021-10-01 PROCEDURE — 3008F BODY MASS INDEX DOCD: CPT | Performed by: FAMILY MEDICINE

## 2021-10-01 PROCEDURE — 90471 IMMUNIZATION ADMIN: CPT | Performed by: FAMILY MEDICINE

## 2021-10-01 PROCEDURE — 3074F SYST BP LT 130 MM HG: CPT | Performed by: FAMILY MEDICINE

## 2021-10-01 PROCEDURE — 83036 HEMOGLOBIN GLYCOSYLATED A1C: CPT | Performed by: FAMILY MEDICINE

## 2021-10-01 PROCEDURE — 99213 OFFICE O/P EST LOW 20 MIN: CPT | Performed by: FAMILY MEDICINE

## 2021-10-01 PROCEDURE — 99397 PER PM REEVAL EST PAT 65+ YR: CPT | Performed by: FAMILY MEDICINE

## 2021-10-01 PROCEDURE — 90662 IIV NO PRSV INCREASED AG IM: CPT | Performed by: FAMILY MEDICINE

## 2021-10-01 RX ORDER — MELATONIN
1000 DAILY
COMMUNITY

## 2021-10-01 RX ORDER — LOSARTAN POTASSIUM 100 MG/1
100 TABLET ORAL NIGHTLY
Qty: 90 TABLET | Refills: 1 | Status: SHIPPED | OUTPATIENT
Start: 2021-10-01

## 2021-10-01 RX ORDER — SIMVASTATIN 20 MG
20 TABLET ORAL NIGHTLY
Qty: 90 TABLET | Refills: 1 | Status: SHIPPED | OUTPATIENT
Start: 2021-10-01

## 2021-10-01 RX ORDER — LEVOTHYROXINE SODIUM 0.07 MG/1
75 TABLET ORAL
Qty: 90 TABLET | Refills: 1 | Status: SHIPPED | OUTPATIENT
Start: 2021-10-01

## 2021-10-01 NOTE — PROGRESS NOTES
HPI:   Vika Thurman is a 73year old female       Hypertension:   Stable. Severity is mild, patient is on one agent to control her hypertension, losartan 100 mg nightly. Pt has been taking medications as instructed, no medication side effects. Class 1 obesity due to excess calories with serious comorbidity and body mass index (BMI) of 33.0 to 33.9 in adult     Constipation     Hypothyroidism (acquired)     Class 1 obesity due to excess calories with serious comorbidity and body mass index (BMI Laterality Date   • APPENDECTOMY  1972   • APPENDECTOMY     • CATARACT     • CATARACT     • COLONOSCOPY     • COLONOSCOPY     • HIP REPLACEMENT SURGERY      November 2019   • HYSTERECTOMY  1983    partial hystero.    • KNEE REPLACEMENT SURGERY     • Adama Rasmussen Body mass index is 35.19 kg/m². GENERAL: NAD, Pleasant nonill appearing obese  female. SKIN: No visible rashes or suspicious lesions. HEENT: atraumatic, normocephalic, EACs and TMs clear normal bilaterally.   Nose and throat not examined due (H) 111 (H)    Sodium      136 - 145 mmol/L  142 139    Potassium      3.5 - 5.1 mmol/L  5.0 6.0 (HH)    Chloride      98 - 112 mmol/L  111 109    Carbon Dioxide, Total      21.0 - 32.0 mmol/L  28.0 30.0    ANION GAP      0 - 18 mmol/L  3 0    BUN      7 - comorbidity and body mass index (bmi) of 35.0 to 35.9 in adult (hcc)  Impaired fasting glucose  Prediabetes  Encounter for long-term current use of medication        1.  Routine general medical examination at a health care facility  Patient provided handout medication  Medication use, risks, benefits, side effects and precautions discussed, patient verbalizes understanding. Questions encouraged and answered to patient's satisfaction. - levothyroxine 75 MCG Oral Tab;  Take 1 tablet (75 mcg total) by mouth be

## 2021-10-01 NOTE — PATIENT INSTRUCTIONS
Prevention Guidelines, Women Ages 72 and Older  Screening tests and vaccines are an important part of managing your health. A screening test is done to find possible disorders or diseases in people who don't have any symptoms.  The goal is to find a disea Multiple tests are available and are used at different times.  Possible tests include:  · Flexible sigmoidoscopy every 5 years, or  · Colonoscopy every 10 years, or  · CT colonography (virtual colonoscopy) every 5 years, or  · Yearly fecal occult blood test hormone (TSH) Only women in this age group with symptoms of thyroid dysfunction Talk with your healthcare provider   Tuberculosis Women at increased risk for infection Talk with your healthcare provider   Vision All women in this age group Every 1 to 2 yea the first. This is given even if you've had shingles before or had a previous zoster live vaccine. · Zoster live vaccine live (ZVL; Zostavax) may be given to healthy adults over age 61. It's given in one dose.    Counseling Who needs it How often   Diet an

## 2021-10-27 ENCOUNTER — HOSPITAL ENCOUNTER (OUTPATIENT)
Dept: MAMMOGRAPHY | Age: 71
Discharge: HOME | End: 2021-10-27
Attending: FAMILY MEDICINE
Payer: COMMERCIAL

## 2021-10-27 DIAGNOSIS — Z12.31 ENCOUNTER FOR SCREENING MAMMOGRAM FOR MALIGNANT NEOPLASM OF BREAST: ICD-10-CM

## 2021-10-27 PROCEDURE — 77067 SCR MAMMO BI INCL CAD: CPT | Performed by: FAMILY MEDICINE

## 2021-10-27 PROCEDURE — 77063 BREAST TOMOSYNTHESIS BI: CPT | Performed by: FAMILY MEDICINE

## 2021-11-01 ENCOUNTER — LAB ENCOUNTER (OUTPATIENT)
Dept: LAB | Facility: HOSPITAL | Age: 71
End: 2021-11-01
Attending: PHYSICIAN ASSISTANT
Payer: COMMERCIAL

## 2021-11-01 DIAGNOSIS — E53.8 VITAMIN B12 DEFICIENCY: ICD-10-CM

## 2021-11-01 PROCEDURE — 36415 COLL VENOUS BLD VENIPUNCTURE: CPT

## 2021-11-01 PROCEDURE — 82607 VITAMIN B-12: CPT

## 2021-11-05 ENCOUNTER — TELEPHONE (OUTPATIENT)
Dept: FAMILY MEDICINE CLINIC | Facility: CLINIC | Age: 71
End: 2021-11-05

## 2021-11-05 NOTE — TELEPHONE ENCOUNTER
11/8/17 permanent disability parking placard done.   This is renewal.  Please advise if you would like for patient to make appointment to be seen to fill out renewal.

## 2021-11-14 NOTE — TELEPHONE ENCOUNTER
Checking on the status of her parking placard form. Pt's friend will be calling Andrew Orr to check to see if the form is completed at 2:30pm so she can come pick it up. Pt is voiced it was ok. I saw the patient and independently performed the critical or key portions of the service with the fellow present. I discussed the case with the fellow and have reviewed and agree with the fellow's documented note. Continue dietary modifications/antidarreal agents    Harleen Ohara MD

## 2022-02-10 RX ORDER — LEVOTHYROXINE SODIUM 0.07 MG/1
75 TABLET ORAL
Qty: 90 TABLET | Refills: 0 | Status: SHIPPED | OUTPATIENT
Start: 2022-02-10

## 2022-02-10 RX ORDER — SIMVASTATIN 20 MG
20 TABLET ORAL NIGHTLY
Qty: 90 TABLET | Refills: 0 | Status: SHIPPED | OUTPATIENT
Start: 2022-02-10

## 2022-02-10 RX ORDER — LOSARTAN POTASSIUM 100 MG/1
100 TABLET ORAL NIGHTLY
Qty: 90 TABLET | Refills: 0 | Status: SHIPPED | OUTPATIENT
Start: 2022-02-10

## 2022-02-10 NOTE — TELEPHONE ENCOUNTER
Patient requesting for medication refill(s) for levothyroxine 75 MCG Oral Tab, losartan 100 MG Oral Tab and simvastatin 20 MG Oral Tab to be sent to [facility] ([address], [address], [phone], [phone] pharmacy. I advised patient to allow up to 24 to 48 hours for a call back from a nurse. She would like a call when the meds have been called in to the pharmacy.

## 2022-05-11 ENCOUNTER — OFFICE VISIT (OUTPATIENT)
Dept: FAMILY MEDICINE CLINIC | Facility: CLINIC | Age: 72
End: 2022-05-11
Payer: COMMERCIAL

## 2022-05-11 VITALS
WEIGHT: 221.38 LBS | HEART RATE: 76 BPM | HEIGHT: 66 IN | TEMPERATURE: 97 F | OXYGEN SATURATION: 99 % | DIASTOLIC BLOOD PRESSURE: 78 MMHG | RESPIRATION RATE: 18 BRPM | BODY MASS INDEX: 35.58 KG/M2 | SYSTOLIC BLOOD PRESSURE: 132 MMHG

## 2022-05-11 DIAGNOSIS — I10 ESSENTIAL HYPERTENSION, BENIGN: Primary | ICD-10-CM

## 2022-05-11 DIAGNOSIS — R73.03 PREDIABETES: ICD-10-CM

## 2022-05-11 DIAGNOSIS — E03.9 HYPOTHYROIDISM (ACQUIRED): ICD-10-CM

## 2022-05-11 DIAGNOSIS — E78.5 DYSLIPIDEMIA: ICD-10-CM

## 2022-05-11 DIAGNOSIS — Z79.899 ENCOUNTER FOR LONG-TERM CURRENT USE OF MEDICATION: ICD-10-CM

## 2022-05-11 DIAGNOSIS — E66.01 CLASS 2 SEVERE OBESITY DUE TO EXCESS CALORIES WITH SERIOUS COMORBIDITY AND BODY MASS INDEX (BMI) OF 35.0 TO 35.9 IN ADULT (HCC): ICD-10-CM

## 2022-05-11 PROBLEM — E66.09 CLASS 1 OBESITY DUE TO EXCESS CALORIES WITH SERIOUS COMORBIDITY AND BODY MASS INDEX (BMI) OF 34.0 TO 34.9 IN ADULT: Status: RESOLVED | Noted: 2020-07-30 | Resolved: 2022-05-11

## 2022-05-11 PROBLEM — E66.09 CLASS 1 OBESITY DUE TO EXCESS CALORIES WITH SERIOUS COMORBIDITY AND BODY MASS INDEX (BMI) OF 33.0 TO 33.9 IN ADULT: Status: RESOLVED | Noted: 2019-07-22 | Resolved: 2022-05-11

## 2022-05-11 PROCEDURE — 3078F DIAST BP <80 MM HG: CPT | Performed by: FAMILY MEDICINE

## 2022-05-11 PROCEDURE — 99214 OFFICE O/P EST MOD 30 MIN: CPT | Performed by: FAMILY MEDICINE

## 2022-05-11 PROCEDURE — 3008F BODY MASS INDEX DOCD: CPT | Performed by: FAMILY MEDICINE

## 2022-05-11 PROCEDURE — 3075F SYST BP GE 130 - 139MM HG: CPT | Performed by: FAMILY MEDICINE

## 2022-05-11 RX ORDER — SIMVASTATIN 20 MG
20 TABLET ORAL NIGHTLY
Qty: 90 TABLET | Refills: 1 | Status: SHIPPED | OUTPATIENT
Start: 2022-05-11

## 2022-05-11 RX ORDER — LEVOTHYROXINE SODIUM 0.07 MG/1
75 TABLET ORAL
Qty: 90 TABLET | Refills: 1 | Status: SHIPPED | OUTPATIENT
Start: 2022-05-11

## 2022-05-11 RX ORDER — LOSARTAN POTASSIUM 100 MG/1
100 TABLET ORAL NIGHTLY
Qty: 90 TABLET | Refills: 1 | Status: SHIPPED | OUTPATIENT
Start: 2022-05-11

## 2022-06-14 ENCOUNTER — TELEPHONE (OUTPATIENT)
Dept: FAMILY MEDICINE CLINIC | Facility: CLINIC | Age: 72
End: 2022-06-14

## 2022-06-14 DIAGNOSIS — K21.9 GASTROESOPHAGEAL REFLUX DISEASE, UNSPECIFIED WHETHER ESOPHAGITIS PRESENT: Primary | ICD-10-CM

## 2022-06-23 ENCOUNTER — TELEPHONE (OUTPATIENT)
Dept: FAMILY MEDICINE CLINIC | Facility: CLINIC | Age: 72
End: 2022-06-23

## 2022-06-23 ENCOUNTER — HOSPITAL ENCOUNTER (OUTPATIENT)
Age: 72
Discharge: HOME | End: 2022-06-23
Payer: COMMERCIAL

## 2022-06-23 VITALS
HEART RATE: 95 BPM | TEMPERATURE: 101 F | BODY MASS INDEX: 30.53 KG/M2 | WEIGHT: 190 LBS | OXYGEN SATURATION: 97 % | DIASTOLIC BLOOD PRESSURE: 77 MMHG | SYSTOLIC BLOOD PRESSURE: 152 MMHG | RESPIRATION RATE: 18 BRPM | HEIGHT: 66 IN

## 2022-06-23 DIAGNOSIS — U07.1 COVID: ICD-10-CM

## 2022-06-23 DIAGNOSIS — Z20.822 ENCOUNTER FOR LABORATORY TESTING FOR COVID-19 VIRUS: Primary | ICD-10-CM

## 2022-06-23 LAB
POCT INFLUENZA A: NEGATIVE
POCT INFLUENZA B: NEGATIVE
SARS-COV-2 RNA RESP QL NAA+PROBE: DETECTED

## 2022-06-23 PROCEDURE — A9150 MISC/EXPER NON-PRESCRIPT DRU: HCPCS | Performed by: NURSE PRACTITIONER

## 2022-06-23 PROCEDURE — U0002 COVID-19 LAB TEST NON-CDC: HCPCS | Performed by: NURSE PRACTITIONER

## 2022-06-23 PROCEDURE — 99213 OFFICE O/P EST LOW 20 MIN: CPT | Performed by: NURSE PRACTITIONER

## 2022-06-23 PROCEDURE — 87502 INFLUENZA DNA AMP PROBE: CPT | Performed by: NURSE PRACTITIONER

## 2022-06-23 RX ORDER — ACETAMINOPHEN 500 MG
500 TABLET ORAL ONCE
Status: COMPLETED | OUTPATIENT
Start: 2022-06-23 | End: 2022-06-23

## 2022-06-23 NOTE — TELEPHONE ENCOUNTER
Patient stated she has scratchy throat, nasal congestion, sinus pressure and body aches that started yesterday and low grade fever (100.1) and chills that started today. Patient advised to go to [facility] in [facility] or [facility] for evaluation. Patient verbalized understanding.

## 2022-06-23 NOTE — ED INITIAL ASSESSMENT (HPI)
Pt presents with sore throat, congestion, ears clogged, body aches.      Pt reports fever at home of 100.9    Pt took 500mg Tylenol PO at 11a

## 2022-06-23 NOTE — TELEPHONE ENCOUNTER
Patient called states she has terrible headache- hurts to even lay head down chills, aches  - does not know if she needs to come in

## 2022-06-24 ENCOUNTER — TELEPHONE (OUTPATIENT)
Dept: CASE MANAGEMENT | Age: 72
End: 2022-06-24

## 2022-06-24 ENCOUNTER — HOSPITAL ENCOUNTER (OUTPATIENT)
Age: 72
Discharge: HOME | End: 2022-06-24
Payer: COMMERCIAL

## 2022-06-24 VITALS
DIASTOLIC BLOOD PRESSURE: 57 MMHG | HEART RATE: 86 BPM | SYSTOLIC BLOOD PRESSURE: 146 MMHG | OXYGEN SATURATION: 97 % | RESPIRATION RATE: 18 BRPM | WEIGHT: 196 LBS | HEIGHT: 66 IN | BODY MASS INDEX: 31.5 KG/M2 | TEMPERATURE: 99 F

## 2022-06-24 DIAGNOSIS — U07.1 COVID-19: Primary | ICD-10-CM

## 2022-06-24 PROCEDURE — M0222 INTRAVENOUS INJECTION, BEBTELOVIMAB, INCLUDES INJECTION AND POST ADMINISTRATIVE MONITORING: HCPCS | Performed by: NURSE PRACTITIONER

## 2022-06-24 PROCEDURE — 99214 OFFICE O/P EST MOD 30 MIN: CPT | Performed by: NURSE PRACTITIONER

## 2022-06-24 PROCEDURE — A9150 MISC/EXPER NON-PRESCRIPT DRU: HCPCS | Performed by: NURSE PRACTITIONER

## 2022-06-24 RX ORDER — ACETAMINOPHEN 500 MG
1000 TABLET ORAL ONCE
Status: COMPLETED | OUTPATIENT
Start: 2022-06-24 | End: 2022-06-24

## 2022-06-24 RX ORDER — BEBTELOVIMAB 87.5 MG/ML
175 INJECTION, SOLUTION INTRAVENOUS ONCE
Status: COMPLETED | OUTPATIENT
Start: 2022-06-24 | End: 2022-06-24

## 2022-06-24 NOTE — TELEPHONE ENCOUNTER
Pt received MAB infusion at [facility] on 6/24/22 for COVID-19. Please follow-up with pt for post-infusion assessment and home monitoring if needed. Thank you.

## 2022-06-24 NOTE — ED INITIAL ASSESSMENT (HPI)
Covid positive 6/23. Symptoms started on wednesday.  C/o sore throat, chills, headache, nasal congestion

## 2022-06-29 ENCOUNTER — TELEPHONE (OUTPATIENT)
Dept: FAMILY MEDICINE CLINIC | Facility: CLINIC | Age: 72
End: 2022-06-29

## 2022-06-29 NOTE — TELEPHONE ENCOUNTER
Patient has a question she is a the end of covid but she said she still feels stuffy, been taking musinex and after eating she feels bloated then gets diarrhea.

## 2022-06-29 NOTE — TELEPHONE ENCOUNTER
Pt Name and  verified. Pt states that she feels \"much much better\" however, still having some stuffiness. Pt states that she notices that she has a lose stool almost always after eating. Pt informed she can get a decongestant otc and also to increase her food with high fiber and try to monitor her stools. Pt states that they are not watery and is not too concerned but wanted to know if this was also a symptom of Covid which this writer did confirm with pt that this can be a symptom experienced with Covid. Pt will try a diferent decongestant if not any better will call back. Advised that if symptoms do not improve over the weekend, she can always go to a [facility], IC or ED. Pt RAVEN.

## 2022-09-26 ENCOUNTER — TELEPHONE (OUTPATIENT)
Dept: FAMILY MEDICINE CLINIC | Facility: CLINIC | Age: 72
End: 2022-09-26

## 2022-09-26 NOTE — TELEPHONE ENCOUNTER
Patient c/o hair loss. She does have upcoming appointment 10/5. She will compete the labs before her appointment and discuss further at appointment.

## 2022-09-26 NOTE — TELEPHONE ENCOUNTER
Patient is requesting another dermatologist that is closer to her. She lives in [place] and lives in [facility]. She would like referred to one in [place].  She has [facility]

## 2022-10-01 ENCOUNTER — LAB ENCOUNTER (OUTPATIENT)
Dept: LAB | Facility: REFERENCE LAB | Age: 72
End: 2022-10-01
Attending: FAMILY MEDICINE
Payer: COMMERCIAL

## 2022-10-01 DIAGNOSIS — R73.03 PREDIABETES: ICD-10-CM

## 2022-10-01 DIAGNOSIS — I10 ESSENTIAL HYPERTENSION, BENIGN: ICD-10-CM

## 2022-10-01 DIAGNOSIS — E03.9 HYPOTHYROIDISM (ACQUIRED): ICD-10-CM

## 2022-10-01 DIAGNOSIS — L65.9 HAIR LOSS: ICD-10-CM

## 2022-10-01 DIAGNOSIS — E78.5 DYSLIPIDEMIA: ICD-10-CM

## 2022-10-01 LAB
ALBUMIN SERPL-MCNC: 3.4 G/DL (ref 3.4–5)
ALBUMIN/GLOB SERPL: 0.9 {RATIO} (ref 1–2)
ALP LIVER SERPL-CCNC: 86 U/L
ALT SERPL-CCNC: 22 U/L
ANION GAP SERPL CALC-SCNC: 6 MMOL/L (ref 0–18)
AST SERPL-CCNC: 17 U/L (ref 15–37)
BASOPHILS # BLD AUTO: 0.05 X10(3) UL (ref 0–0.2)
BASOPHILS NFR BLD AUTO: 1.2 %
BILIRUB SERPL-MCNC: 0.7 MG/DL (ref 0.1–2)
BUN BLD-MCNC: 10 MG/DL (ref 7–18)
BUN/CREAT SERPL: 11.8 (ref 10–20)
CALCIUM BLD-MCNC: 9.3 MG/DL (ref 8.5–10.1)
CHLORIDE SERPL-SCNC: 109 MMOL/L (ref 98–112)
CHOLEST SERPL-MCNC: 146 MG/DL (ref ?–200)
CO2 SERPL-SCNC: 29 MMOL/L (ref 21–32)
CREAT BLD-MCNC: 0.85 MG/DL
DEPRECATED RDW RBC AUTO: 50 FL (ref 35.1–46.3)
EOSINOPHIL # BLD AUTO: 0.25 X10(3) UL (ref 0–0.7)
EOSINOPHIL NFR BLD AUTO: 6.1 %
ERYTHROCYTE [DISTWIDTH] IN BLOOD BY AUTOMATED COUNT: 14.5 % (ref 11–15)
EST. AVERAGE GLUCOSE BLD GHB EST-MCNC: 128 MG/DL (ref 68–126)
FASTING PATIENT LIPID ANSWER: YES
FASTING STATUS PATIENT QL REPORTED: YES
GFR SERPLBLD BASED ON 1.73 SQ M-ARVRAT: 74 ML/MIN/1.73M2 (ref 60–?)
GLOBULIN PLAS-MCNC: 3.6 G/DL (ref 2.8–4.4)
GLUCOSE BLD-MCNC: 110 MG/DL (ref 70–99)
HBA1C MFR BLD: 6.1 % (ref ?–5.7)
HCT VFR BLD AUTO: 44.4 %
HDLC SERPL-MCNC: 69 MG/DL (ref 40–59)
HGB BLD-MCNC: 13.5 G/DL
IMM GRANULOCYTES # BLD AUTO: 0 X10(3) UL (ref 0–1)
IMM GRANULOCYTES NFR BLD: 0 %
LDLC SERPL CALC-MCNC: 66 MG/DL (ref ?–100)
LYMPHOCYTES # BLD AUTO: 1.29 X10(3) UL (ref 1–4)
LYMPHOCYTES NFR BLD AUTO: 31.5 %
MCH RBC QN AUTO: 28.2 PG (ref 26–34)
MCHC RBC AUTO-ENTMCNC: 30.4 G/DL (ref 31–37)
MCV RBC AUTO: 92.9 FL
MONOCYTES # BLD AUTO: 0.5 X10(3) UL (ref 0.1–1)
MONOCYTES NFR BLD AUTO: 12.2 %
NEUTROPHILS # BLD AUTO: 2 X10 (3) UL (ref 1.5–7.7)
NEUTROPHILS # BLD AUTO: 2 X10(3) UL (ref 1.5–7.7)
NEUTROPHILS NFR BLD AUTO: 49 %
NONHDLC SERPL-MCNC: 77 MG/DL (ref ?–130)
OSMOLALITY SERPL CALC.SUM OF ELEC: 298 MOSM/KG (ref 275–295)
PLATELET # BLD AUTO: 191 10(3)UL (ref 150–450)
POTASSIUM SERPL-SCNC: 4.2 MMOL/L (ref 3.5–5.1)
PROT SERPL-MCNC: 7 G/DL (ref 6.4–8.2)
RBC # BLD AUTO: 4.78 X10(6)UL
SODIUM SERPL-SCNC: 144 MMOL/L (ref 136–145)
T4 FREE SERPL-MCNC: 1.3 NG/DL (ref 0.8–1.7)
TRIGL SERPL-MCNC: 52 MG/DL (ref 30–149)
TSI SER-ACNC: 0.62 MIU/ML (ref 0.36–3.74)
VLDLC SERPL CALC-MCNC: 8 MG/DL (ref 0–30)
WBC # BLD AUTO: 4.1 X10(3) UL (ref 4–11)

## 2022-10-01 PROCEDURE — 85025 COMPLETE CBC W/AUTO DIFF WBC: CPT

## 2022-10-01 PROCEDURE — 80061 LIPID PANEL: CPT

## 2022-10-01 PROCEDURE — 83540 ASSAY OF IRON: CPT

## 2022-10-01 PROCEDURE — 82728 ASSAY OF FERRITIN: CPT

## 2022-10-01 PROCEDURE — 84439 ASSAY OF FREE THYROXINE: CPT

## 2022-10-01 PROCEDURE — 80053 COMPREHEN METABOLIC PANEL: CPT

## 2022-10-01 PROCEDURE — 83036 HEMOGLOBIN GLYCOSYLATED A1C: CPT

## 2022-10-01 PROCEDURE — 36415 COLL VENOUS BLD VENIPUNCTURE: CPT

## 2022-10-01 PROCEDURE — 84466 ASSAY OF TRANSFERRIN: CPT

## 2022-10-01 PROCEDURE — 84443 ASSAY THYROID STIM HORMONE: CPT

## 2022-10-05 ENCOUNTER — TELEPHONE (OUTPATIENT)
Dept: FAMILY MEDICINE CLINIC | Facility: CLINIC | Age: 72
End: 2022-10-05

## 2022-10-05 ENCOUNTER — OFFICE VISIT (OUTPATIENT)
Dept: FAMILY MEDICINE CLINIC | Facility: CLINIC | Age: 72
End: 2022-10-05
Payer: COMMERCIAL

## 2022-10-05 VITALS
HEART RATE: 90 BPM | SYSTOLIC BLOOD PRESSURE: 120 MMHG | BODY MASS INDEX: 34.72 KG/M2 | HEIGHT: 66 IN | RESPIRATION RATE: 20 BRPM | DIASTOLIC BLOOD PRESSURE: 75 MMHG | TEMPERATURE: 97 F | WEIGHT: 216 LBS | OXYGEN SATURATION: 97 %

## 2022-10-05 DIAGNOSIS — M62.838 TRAPEZIUS MUSCLE SPASM: ICD-10-CM

## 2022-10-05 DIAGNOSIS — E03.9 HYPOTHYROIDISM (ACQUIRED): ICD-10-CM

## 2022-10-05 DIAGNOSIS — E78.5 DYSLIPIDEMIA: ICD-10-CM

## 2022-10-05 DIAGNOSIS — Z79.899 ENCOUNTER FOR LONG-TERM CURRENT USE OF MEDICATION: ICD-10-CM

## 2022-10-05 DIAGNOSIS — E66.09 CLASS 1 OBESITY DUE TO EXCESS CALORIES WITH SERIOUS COMORBIDITY AND BODY MASS INDEX (BMI) OF 34.0 TO 34.9 IN ADULT: ICD-10-CM

## 2022-10-05 DIAGNOSIS — Z00.00 ROUTINE GENERAL MEDICAL EXAMINATION AT A HEALTH CARE FACILITY: Primary | ICD-10-CM

## 2022-10-05 DIAGNOSIS — I10 ESSENTIAL HYPERTENSION, BENIGN: ICD-10-CM

## 2022-10-05 DIAGNOSIS — M25.512 CHRONIC LEFT SHOULDER PAIN: ICD-10-CM

## 2022-10-05 DIAGNOSIS — Z23 NEED FOR VACCINATION: ICD-10-CM

## 2022-10-05 DIAGNOSIS — Z12.31 ENCOUNTER FOR SCREENING MAMMOGRAM FOR MALIGNANT NEOPLASM OF BREAST: ICD-10-CM

## 2022-10-05 DIAGNOSIS — L65.9 HAIR LOSS: ICD-10-CM

## 2022-10-05 DIAGNOSIS — G89.29 CHRONIC LEFT SHOULDER PAIN: ICD-10-CM

## 2022-10-05 LAB
DEPRECATED HBV CORE AB SER IA-ACNC: 78.2 NG/ML
IRON SATN MFR SERPL: 26 %
IRON SERPL-MCNC: 77 UG/DL
TIBC SERPL-MCNC: 291 UG/DL (ref 240–450)
TRANSFERRIN SERPL-MCNC: 195 MG/DL (ref 200–360)

## 2022-10-05 PROCEDURE — 3008F BODY MASS INDEX DOCD: CPT | Performed by: FAMILY MEDICINE

## 2022-10-05 PROCEDURE — 90662 IIV NO PRSV INCREASED AG IM: CPT | Performed by: FAMILY MEDICINE

## 2022-10-05 PROCEDURE — 99214 OFFICE O/P EST MOD 30 MIN: CPT | Performed by: FAMILY MEDICINE

## 2022-10-05 PROCEDURE — 3074F SYST BP LT 130 MM HG: CPT | Performed by: FAMILY MEDICINE

## 2022-10-05 PROCEDURE — 3078F DIAST BP <80 MM HG: CPT | Performed by: FAMILY MEDICINE

## 2022-10-05 PROCEDURE — 99397 PER PM REEVAL EST PAT 65+ YR: CPT | Performed by: FAMILY MEDICINE

## 2022-10-05 PROCEDURE — 90471 IMMUNIZATION ADMIN: CPT | Performed by: FAMILY MEDICINE

## 2022-10-05 RX ORDER — LOSARTAN POTASSIUM 100 MG/1
100 TABLET ORAL NIGHTLY
Qty: 90 TABLET | Refills: 1 | Status: SHIPPED | OUTPATIENT
Start: 2022-10-05 | End: 2022-10-05

## 2022-10-05 RX ORDER — LOSARTAN POTASSIUM 100 MG/1
100 TABLET ORAL NIGHTLY
Qty: 90 TABLET | Refills: 1 | Status: SHIPPED | OUTPATIENT
Start: 2022-10-05

## 2022-10-05 RX ORDER — SIMVASTATIN 20 MG
20 TABLET ORAL NIGHTLY
Qty: 90 TABLET | Refills: 1 | Status: SHIPPED | OUTPATIENT
Start: 2022-10-05 | End: 2022-10-05

## 2022-10-05 RX ORDER — SIMVASTATIN 20 MG
20 TABLET ORAL NIGHTLY
Qty: 90 TABLET | Refills: 1 | Status: SHIPPED | OUTPATIENT
Start: 2022-10-05

## 2022-10-05 RX ORDER — LEVOTHYROXINE SODIUM 0.07 MG/1
75 TABLET ORAL
Qty: 90 TABLET | Refills: 1 | Status: SHIPPED | OUTPATIENT
Start: 2022-10-05 | End: 2022-10-05

## 2022-10-05 RX ORDER — LEVOTHYROXINE SODIUM 0.07 MG/1
75 TABLET ORAL
Qty: 90 TABLET | Refills: 1 | Status: SHIPPED | OUTPATIENT
Start: 2022-10-05

## 2022-10-05 NOTE — TELEPHONE ENCOUNTER
Patient was seen today and prescribed 3 medications they went to her local pharmacy and is requesting to be canceled and please send to mail order

## 2022-10-27 DIAGNOSIS — I10 ESSENTIAL HYPERTENSION, BENIGN: ICD-10-CM

## 2022-10-27 DIAGNOSIS — Z79.899 ENCOUNTER FOR LONG-TERM CURRENT USE OF MEDICATION: ICD-10-CM

## 2022-10-27 DIAGNOSIS — E78.5 DYSLIPIDEMIA: ICD-10-CM

## 2022-10-27 RX ORDER — SIMVASTATIN 20 MG
TABLET ORAL
Qty: 90 TABLET | Refills: 1 | OUTPATIENT
Start: 2022-10-27

## 2022-10-27 RX ORDER — LOSARTAN POTASSIUM 100 MG/1
TABLET ORAL
Qty: 90 TABLET | Refills: 1 | OUTPATIENT
Start: 2022-10-27

## 2022-12-01 ENCOUNTER — TELEPHONE (OUTPATIENT)
Dept: FAMILY MEDICINE CLINIC | Facility: CLINIC | Age: 72
End: 2022-12-01

## 2022-12-01 NOTE — TELEPHONE ENCOUNTER
Tp states she has sinus congestion, ears have pain, on off nasal congestion and some dull headache. Requesting advice  No fevers or other sxs.   No available appts until January

## 2022-12-01 NOTE — TELEPHONE ENCOUNTER
Patient c/o nasal stuffiness and ear fullness for 2-3 days. Advised otc non-drowsy antihistamine, flonase nasal spray, saline nasal spray, warm steams and warm moist packs to sinus areas. She VU and is in agreement.

## 2022-12-15 ENCOUNTER — HOSPITAL ENCOUNTER (OUTPATIENT)
Dept: MAMMOGRAPHY | Age: 72
Discharge: HOME | End: 2022-12-15
Attending: FAMILY MEDICINE
Payer: COMMERCIAL

## 2022-12-15 DIAGNOSIS — Z12.31 ENCOUNTER FOR SCREENING MAMMOGRAM FOR MALIGNANT NEOPLASM OF BREAST: ICD-10-CM

## 2022-12-15 PROCEDURE — 77067 SCR MAMMO BI INCL CAD: CPT | Performed by: FAMILY MEDICINE

## 2022-12-15 PROCEDURE — 77063 BREAST TOMOSYNTHESIS BI: CPT | Performed by: FAMILY MEDICINE

## 2024-11-14 NOTE — ED QUICK NOTES
Pt provided written information regarding Monoclonal Antibody Therapy. Pt is considering infusion although she has a lot of questions and concerns. Pt want to speak to leland combs about med methocarbanal (pt says spelling might not be correct)

## (undated) DIAGNOSIS — Z79.899 ENCOUNTER FOR LONG-TERM CURRENT USE OF MEDICATION: ICD-10-CM

## (undated) DIAGNOSIS — I10 ESSENTIAL HYPERTENSION, BENIGN: ICD-10-CM

## (undated) DIAGNOSIS — E03.9 HYPOTHYROIDISM (ACQUIRED): ICD-10-CM

## (undated) DIAGNOSIS — E78.5 DYSLIPIDEMIA: ICD-10-CM

## (undated) DEVICE — BLADE ELECTRODE: Brand: EDGE

## (undated) DEVICE — DRAPE,U/SHT,SPLIT,FILM,60X84,STERILE: Brand: MEDLINE

## (undated) DEVICE — CHLORAPREP 26ML APPLICATOR

## (undated) DEVICE — DECANTER BAG 9": Brand: MEDLINE INDUSTRIES, INC.

## (undated) DEVICE — TOTAL HIP CDS: Brand: MEDLINE INDUSTRIES, INC.

## (undated) DEVICE — 450 ML BOTTLE OF 0.05% CHLORHEXIDINE GLUCONATE IN 99.95% STERILE WATER FOR IRRIGATION, USP AND APPLICATOR.: Brand: IRRISEPT ANTIMICROBIAL WOUND LAVAGE

## (undated) DEVICE — SUTURE VICRYL 2-0 CP-1

## (undated) DEVICE — 2T11 #2 PDO 36 X 36: Brand: 2T11 #2 PDO 36 X 36

## (undated) DEVICE — HOOD, PEEL-AWAY: Brand: FLYTE

## (undated) DEVICE — STERILE POLYISOPRENE POWDER-FREE SURGICAL GLOVES: Brand: PROTEXIS

## (undated) DEVICE — KENDALL SCD EXPRESS SLEEVES, KNEE LENGTH, MEDIUM: Brand: KENDALL SCD

## (undated) DEVICE — DRESSING AQUACEL AG 3.5X12

## (undated) DEVICE — DRESSING AQUACEL AG 3.5 X 10

## (undated) DEVICE — Device: Brand: STABLECUT®

## (undated) DEVICE — STERILE HOOK LOCK LATEX FREE ELASTIC BANDAGE 6INX5YD: Brand: HOOK LOCK™

## (undated) DEVICE — SUTURE VICRYL 1 OS-6

## (undated) DEVICE — GOWN SURG AERO CHROME XXL

## (undated) DEVICE — SPECIMEN CONTAINER,POSITIVE SEAL INDICATOR, OR PACKAGED: Brand: PRECISION

## (undated) DEVICE — PILLOW FX 56X38X15CM MED HIP

## (undated) DEVICE — BIPOLAR SEALER 23-112-1 AQM 6.0: Brand: AQUAMANTYS™

## (undated) NOTE — LETTER
Date: 10/2/2017    Patient Name: Igor Diaz          To Whom it may concern: The above patient was seen at the [facility] for treatment of a medical condition.     This patient should be excused from attending work from 10/2/17 thro

## (undated) NOTE — MR AVS SNAPSHOT
[address]  [phone]               Thank you for choosing us for your health care visit with Polo Russell PT. We are glad to serve you and happy to provide you with this summary of your visit.   Please he staff. They will take your name and direct you to our P.T. clinic within the building. For security purposes, please check in with the reception staff at every visit.                                           Mar 30, 2017  4:00 PM   FOLLOW UP with Helen Apodaca

## (undated) NOTE — MR AVS SNAPSHOT
[facility]  [phone]               Thank you for choosing us for your health care visit with Ottoniel Reardon, PT. We are glad to serve you and happy to provide you with this summary of your visit.   Please he staff. They will take your name and direct you to our P.T. clinic within the building. For security purposes, please check in with the reception staff at every visit.                                           Jan 24, 2017  2:30 PM   PT VISIT BY THERAPIST wi Meclofenamate Sodium Hives    CAPS    Levaquin Dizziness          Lexapro Dizziness    fatigue                     Current Medications          This list is accurate as of: 1/13/17 12:25 PM.  Always use your most recent med list.                Biotin 100

## (undated) NOTE — LETTER
04/20/18        [address]      Dear Sanna Blandon records indicate that you have outstanding lab work and or testing that was ordered for you and has not yet been completed:          CBC W/DIFF      COMP METABOL

## (undated) NOTE — MR AVS SNAPSHOT
[address]  [phone]               Thank you for choosing us for your health care visit with Gorge Chambers PT. We are glad to serve you and happy to provide you with this summary of your visit.   Please he staff. They will take your name and direct you to our P.T. clinic within the building. For security purposes, please check in with the reception staff at every visit.                                           Feb 09, 2017  1:30 PM   PT VISIT BY THERAPIST wi Your appointment is scheduled at the [address] Physical Therapy Department, inside the [address] [facility], at [facility] (enter via [address]).   Convenient parking is available in the parking lot in front of the Fi

## (undated) NOTE — MR AVS SNAPSHOT
[address]  [phone]               Thank you for choosing us for your health care visit with Miguel Angel Alexander PT. We are glad to serve you and happy to provide you with this summary of your visit.   Please he available in the parking lot in front of the [facility]. When you enter the [facility], please check in with the  reception staff. They will take your name and direct you to our P.T. clinic within the building.  For security purposes, ple

## (undated) NOTE — MR AVS SNAPSHOT
[facility] [facility]  [phone]               Thank you for choosing us for your health care visit with Moises Richards DO.   We are glad to serve you and happy to provide you with this s Follow-up Instructions     Return in about 2 weeks (around 5/22/2017) for Chronic conditions and blood test results. Scheduling Instructions     Monday May 08, 2017     Imaging:  ROMÁN SCREENING BILAT (OXO=[id number])    Instructions:   To schedule an appoint your provider may recommend MRI screening as well.  All women age 36to 79years old, who are in good health, should be screened for breast cancer with mammography every 1 to 2 years after counseling by their healthcare provider about the possible risks and Tuberculosis (TB) test: if you have a high risk of TB; for example, because you are a health worker, drug user, or immigrant, or because you have close contact with someone infected with TB   Bone density test for osteoporosis: at age 72 years if your risk have a flu shot.    Measles, mumps, and rubella shot (MMR) if you were born after 1954 unless you have already had the shot or the diseases   Hepatitis A shot if you are at risk, for example, through travel or your job, including [address] [place], [place] [address]ard  Dental health: Visit your dentist regularly. Brush your teeth with fluoride toothpaste daily. Also floss your teeth daily.    Sexual behavior: Prevent sexually transmitted infections by avoiding high-risk sexual behavior and by using latex or polyurethane c If you have questions, you can call [phone] to talk to our [facility] Staff. Remember, [email or web address] is NOT to be used for urgent needs. For medical emergencies, dial 911. Visit [email or web address] to learn more.            Visit [facility]

## (undated) NOTE — MR AVS SNAPSHOT
[address] [phone]               Thank you for choosing us for your health care visit with Silvano Wood PT. We are glad to serve you and happy to provide you with this summary of your visit.   Please he staff. They will take your name and direct you to our P.T. clinic within the building. For security purposes, please check in with the reception staff at every visit.                                           Feb 14, 2017  1:30 PM   PT VISIT BY THERAPIST wi Allergies as of Feb 02, 2017     Meclofenamate Sodium Hives    CAPS    Levaquin Dizziness          Lexapro Dizziness    fatigue                     Current Medications          This list is accurate as of: 2/2/17  1:26 PM.  Always use your most recent med

## (undated) NOTE — LETTER
[facility] Testing Department  Phone: [phone]  Right Fax: [phone]  [facility] [address] Veronica Turcios RN Date: 11/15/19    Patient Name: Tresa Lobstein  Surgery Date: 11/20/2019    CSN: [id number]  Medical Record: [id number]

## (undated) NOTE — MR AVS SNAPSHOT
[address]  [phone]               Thank you for choosing us for your health care visit with Yessica Alfaro PT. We are glad to serve you and happy to provide you with this summary of your visit.   Please he staff. They will take your name and direct you to our P.T. clinic within the building. For security purposes, please check in with the reception staff at every visit.                                           Jan 26, 2017 12:30 PM   PT VISIT BY THERAPIST wi Meclofenamate Sodium Hives    CAPS    Levaquin Dizziness          Lexapro Dizziness    fatigue                     Current Medications          This list is accurate as of: 1/17/17  3:44 PM.  Always use your most recent med list.                Biotin 100

## (undated) NOTE — MR AVS SNAPSHOT
[facility]  [phone]               Thank you for choosing us for your health care visit with Aldair Galo DO.   We are glad to serve you and happy to provide you with this s reaching a BMI of less than 25 is not always reasonable (or possible).   Make an action plan  Habits don’t change overnight. Setting your goals too high can leave you feeling discouraged if you can’t reach them. Be realistic.  Choose one or two small change Take 1 tablet (75 mcg total) by mouth before breakfast.   What changed:  when to take this   Commonly known as:  SYNTHROID, LEVOTHROID           losartan 100 MG Tabs   Take 1 tablet (100 mg total) by mouth daily.    Commonly known as:  COZAAR           omep

## (undated) NOTE — MR AVS SNAPSHOT
[address]  [phone]               Thank you for choosing us for your health care visit with Bertha Campa PT. We are glad to serve you and happy to provide you with this summary of your visit.   Please he staff. They will take your name and direct you to our P.T. clinic within the building. For security purposes, please check in with the reception staff at every visit.                                           Jan 19, 2017  1:15 PM   PT VISIT BY THERAPIST wi Meclofenamate Sodium Hives    CAPS    Levaquin Dizziness          Lexapro Dizziness    fatigue                     Current Medications          This list is accurate as of: 1/11/17  6:08 PM.  Always use your most recent med list.                Biotin 100

## (undated) NOTE — MR AVS SNAPSHOT
[address]  [phone]               Thank you for choosing us for your health care visit with Emily Emmanuel PT. We are glad to serve you and happy to provide you with this summary of your visit.   Please help available in the parking lot in front of the [facility]. When you enter the [facility], please check in with the  reception staff. They will take your name and direct you to our P.T. clinic within the building.  For security purposes, ple Take 1 tablet (20 mg total) by mouth nightly. Commonly known as:  ZOCOR           Vitamin D 2000 UNITS Caps   Take 1 capsule by mouth daily.                    [employer]     Call the [employer] for assistance with your inactive [employer] account    If you have

## (undated) NOTE — MR AVS SNAPSHOT
[address]  [phone]               Thank you for choosing us for your health care visit with Aniceto Soares PT. We are glad to serve you and happy to provide you with this summary of your visit.   Please he staff. They will take your name and direct you to our P.T. clinic within the building. For security purposes, please check in with the reception staff at every visit.                                           Feb 23, 2017  3:45 PM   PT VISIT BY THERAPIST wi Take 1 capsule by mouth daily. simvastatin 20 MG Tabs   Take 1 tablet (20 mg total) by mouth nightly. Commonly known as:  ZOCOR           Vitamin D 2000 units Caps   Take 1 capsule by mouth daily.                    MyChart     Call the helpdesk

## (undated) NOTE — LETTER
Date: 10/2/2017    Patient Name: Mariann Lantigua          To Whom it may concern: The above patient was seen at the [facility] for treatment of a medical condition.     This patient should be excused from attending work from 10/2/17 thro

## (undated) NOTE — MR AVS SNAPSHOT
[address] [phone]               Thank you for choosing us for your health care visit with Polo Russell PT. We are glad to serve you and happy to provide you with this summary of your visit.   Please he Take 1-2 tablets by mouth every 4 (four) hours as needed. Commonly known as:  NORCO           Levothyroxine Sodium 75 MCG Tabs   Take 1 tablet (75 mcg total) by mouth daily.    Commonly known as:  SYNTHROID, LEVOTHROID           losartan 100 MG Tabs   [facility]

## (undated) NOTE — MR AVS SNAPSHOT
[address]  [phone]               Thank you for choosing us for your health care visit with Sharif Gould PT. We are glad to serve you and happy to provide you with this summary of your visit.   Please help the building. For security purposes, please check in with the reception staff at every visit.                                           Jan 26, 2017  1:50 PM   POSTOP with Lea Driscoll MD   [address] [facility] Lexapro Dizziness    fatigue                     Current Medications          This list is accurate as of: 1/19/17  2:30 PM.  Always use your most recent med list.                Biotin 1000 MCG Tabs   Take 1,000 mcg by mouth daily.            HYDROcodone-

## (undated) NOTE — MR AVS SNAPSHOT
[address]  [phone]               Thank you for choosing us for your health care visit with Deven Piña PT. We are glad to serve you and happy to provide you with this summary of your visit.   Please he staff. They will take your name and direct you to our P.T. clinic within the building. For security purposes, please check in with the reception staff at every visit.                                           Jan 12, 2017  1:30 PM   PT VISIT BY THERAPIST wi Your appointment is scheduled at the [facility] Physical Therapy Department, inside the [employer], at [facility] (enter via [facility]).   Convenient parking is available in the parking lot in front of the Fi

## (undated) NOTE — MR AVS SNAPSHOT
[address]  [phone]               Thank you for choosing us for your health care visit with Poli Lubin PT. We are glad to serve you and happy to provide you with this summary of your visit.   Please he staff. They will take your name and direct you to our P.T. clinic within the building. For security purposes, please check in with the reception staff at every visit.                                           Jan 17, 2017  1:30 PM   PT VISIT BY THERAPIST wi Your appointment is scheduled at the THE [facility] Physical Therapy Department, inside the PATIENT’S [facility], at [facility] (enter via [facility]).   Convenient parking is available in the parking lot in front of the Fi